# Patient Record
Sex: MALE | Race: WHITE | NOT HISPANIC OR LATINO | Employment: FULL TIME | URBAN - METROPOLITAN AREA
[De-identification: names, ages, dates, MRNs, and addresses within clinical notes are randomized per-mention and may not be internally consistent; named-entity substitution may affect disease eponyms.]

---

## 2018-12-22 ENCOUNTER — HOSPITAL ENCOUNTER (EMERGENCY)
Facility: CLINIC | Age: 43
Discharge: HOME | End: 2018-12-22
Attending: PHYSICIAN ASSISTANT | Admitting: PHYSICIAN ASSISTANT
Payer: COMMERCIAL

## 2018-12-22 ENCOUNTER — APPOINTMENT (OUTPATIENT)
Dept: CT IMAGING | Facility: CLINIC | Age: 43
End: 2018-12-22
Attending: PHYSICIAN ASSISTANT
Payer: COMMERCIAL

## 2018-12-22 ENCOUNTER — APPOINTMENT (OUTPATIENT)
Dept: GENERAL RADIOLOGY | Facility: CLINIC | Age: 43
End: 2018-12-22
Attending: PHYSICIAN ASSISTANT
Payer: COMMERCIAL

## 2018-12-22 VITALS
DIASTOLIC BLOOD PRESSURE: 84 MMHG | SYSTOLIC BLOOD PRESSURE: 133 MMHG | WEIGHT: 147 LBS | TEMPERATURE: 99 F | RESPIRATION RATE: 20 BRPM | OXYGEN SATURATION: 99 % | HEART RATE: 82 BPM

## 2018-12-22 DIAGNOSIS — S40.011A CONTUSION OF RIGHT SHOULDER, INITIAL ENCOUNTER: ICD-10-CM

## 2018-12-22 DIAGNOSIS — S00.83XA FACIAL HEMATOMA, INITIAL ENCOUNTER: ICD-10-CM
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
## 2018-12-22 PROCEDURE — 73030 X-RAY EXAM OF SHOULDER: CPT | Mod: TC,RT

## 2018-12-22 PROCEDURE — 99285 EMERGENCY DEPT VISIT HI MDM: CPT | Mod: 25 | Performed by: PHYSICIAN ASSISTANT

## 2018-12-22 PROCEDURE — 70450 CT HEAD/BRAIN W/O DYE: CPT

## 2018-12-22 PROCEDURE — 99284 EMERGENCY DEPT VISIT MOD MDM: CPT | Mod: Z6 | Performed by: PHYSICIAN ASSISTANT

## 2018-12-22 PROCEDURE — 70486 CT MAXILLOFACIAL W/O DYE: CPT

## 2018-12-22 NOTE — ED TRIAGE NOTES
Patient fell in his driveway about 1300 today. He has a hematoma to his right cheek. He admits to drinking 10 beers today.

## 2018-12-22 NOTE — ED PROVIDER NOTES
History     Chief Complaint   Patient presents with     Head Injury     The history is provided by the patient.     Keith Cooper is a 43 year old male who presents to the ED complaining of right shoulder and right sided facial pain. Patient states he slipped on a patch of ice this afternoon and fell onto both his shoulder and face. He didn't lose consciousness from the fall. He applied ice immediately. Patient has significant swelling to his face but denies significant amounts of pain currently. Patient denies bloody nose. His vision is normal. He denies confusion. Patient had a few drinks this morning, approximately 10 beers. He has a history of 2 cataract surgeries. He has a history of many broken bones because he has osteogenesis imperfecta.    Problem List:    Patient Active Problem List    Diagnosis Date Noted     Open wound of finger 09/07/2001     Priority: Medium     Problem list name updated by automated process. Provider to review          Past Medical History:    History reviewed. No pertinent past medical history.    Past Surgical History:    Past Surgical History:   Procedure Laterality Date     EYE SURGERY       ORTHOPEDIC SURGERY         Family History:    No family history on file.    Social History:  Marital Status:  Single [1]  Social History     Tobacco Use     Smoking status: Never Smoker     Smokeless tobacco: Current User   Substance Use Topics     Alcohol use: Yes     Comment: occassionally     Drug use: No        Medications:      cetirizine (ZYRTEC) 10 MG tablet   DiphenhydrAMINE HCl (BENADRYL PO)   fexofenadine (ALLEGRA) 180 MG tablet         Review of Systems   All other systems reviewed and are negative.      Physical Exam   BP: 133/84  Pulse: 82  Temp: 99  F (37.2  C)  Resp: 20  Weight: 66.7 kg (147 lb)  SpO2: 99 %      Physical Exam   Constitutional: He is oriented to person, place, and time. No distress.   HENT:   Head: Normocephalic and atraumatic.       Right Ear: External ear  normal.   Left Ear: External ear normal.   Nose: Nose normal.   Mouth/Throat: Oropharynx is clear and moist. No oropharyngeal exudate.   No hemotympanum.  Negative machuca sign.   Eyes: Conjunctivae and EOM are normal. Pupils are equal, round, and reactive to light. Right eye exhibits no discharge. Left eye exhibits no discharge. No scleral icterus.   Neck: Normal range of motion. Neck supple. No thyromegaly present.   Cardiovascular: Normal rate, regular rhythm and normal heart sounds.   No murmur heard.  Pulmonary/Chest: Effort normal and breath sounds normal. No respiratory distress. He has no wheezes. He has no rales. He exhibits no tenderness.   Abdominal: Soft. Bowel sounds are normal. He exhibits no distension and no mass. There is no tenderness. There is no rebound and no guarding.   Musculoskeletal: He exhibits no edema or deformity.        Right shoulder: He exhibits decreased range of motion and tenderness (superior humerus and distal clavicle). He exhibits no swelling, no effusion, no crepitus, no deformity and no laceration.        Right elbow: Normal.He exhibits normal range of motion, no swelling, no effusion, no deformity and no laceration. No tenderness found. No radial head, no medial epicondyle, no lateral epicondyle and no olecranon process tenderness noted.        Cervical back: Normal. He exhibits normal range of motion, no tenderness, no bony tenderness and no swelling.   Lymphadenopathy:     He has no cervical adenopathy.   Neurological: He is alert and oriented to person, place, and time. No cranial nerve deficit.   Skin: Skin is warm and dry. Capillary refill takes less than 2 seconds. No rash noted. He is not diaphoretic. No erythema.   Psychiatric: He has a normal mood and affect. His behavior is normal. Thought content normal.   Nursing note and vitals reviewed.      ED Course        Procedures               Critical Care time:  none               Results for orders placed or performed  during the hospital encounter of 12/22/18 (from the past 24 hour(s))   XR Shoulder Right 3 Views    Narrative    XR SHOULDER RT G/E 3 VW 12/22/2018 3:01 PM    COMPARISON: None.    HISTORY: Proximal humerus pain and distal clavicle pain after fall.      Impression    IMPRESSION: No fracture or dislocation seen in the RIGHT shoulder.  Joint spaces are preserved. No significant soft tissue swelling.    BETTE MORRIS MD   CT Head w/o Contrast    Narrative    CT SCAN OF THE HEAD WITHOUT CONTRAST   12/22/2018 3:13 PM     HISTORY: Head trauma, frontal headache, fall, intoxicated.    TECHNIQUE:  Axial images of the head and coronal reformations without  IV contrast material. Radiation dose for this scan was reduced using  automated exposure control, adjustment of the mA and/or kV according  to patient size, or iterative reconstruction technique.    COMPARISON: None.    FINDINGS:  There is generalized atrophy of the brain more than  expected for age. There is an arachnoid cyst posteriorly in the right  posterior cranial fossa causing mild impression on the cerebellar  hemisphere. There is no evidence of intracranial hemorrhage, mass,  acute infarct or anomaly.     The visualized portions of the sinuses and mastoids appear normal.  There is no evidence of trauma.      Impression    IMPRESSION:    1. No evidence of acute trauma.  2. Brain atrophy.  3. Arachnoid cyst in the right posterior cranial fossa.     POP DIEZ MD   CT Maxillofacial w/o Contrast    Narrative    CT SCAN OF THE FACE WITHOUT CONTRAST 12/22/2018 3:14 PM     HISTORY: Facial trauma, fracture suspected; fall, history of  osteogenesis imperfecta, right zygomatic arch swelling/pain.    TECHNIQUE: Radiation dose for this scan was reduced using automated  exposure control, adjustment of the mA and/or kV according to patient  size, or iterative reconstruction technique. Noncontrast axial scans  and coronal and sagittal reformations.     COMPARISON:  None.    FINDINGS: Soft tissue swelling is seen lateral to the right malar  eminence with a small focal hematoma. There is no underlying fracture.  Left maxillary sinus is hypoplastic. No other abnormality is seen.      Impression    IMPRESSION: Subcutaneous hematoma and soft tissue swelling over the  right malar eminence with no underlying fracture.       POP DIEZ MD       Medications - No data to display    Assessments & Plan (with Medical Decision Making)     Fall  Facial hematoma, initial encounter  Contusion of right shoulder, initial encounter   Keith Cooper is a 43 year old male with underlying osteogenesis imperfecta who presents to the ED complaining of right sided facial pain and swelling along with right shoulder pain. He was under alcoholic intoxication this morning when he slipped on the ice. Patient's vitals are unremarkable. Upon examination he has a significant hematoma on the zygomatic arch of the right face.  Extraocular movements intact.  No other periorbital tenderness.  Also has some tenderness of the superior humerus and distal right clavicle.  No deformity.. Head CT and XR of the left shoulder was collected, and no evidence to suggest underlying fracture.   Rest, ice, compression, and elevation discussed.  Tylenol as needed for discomfort.  He declined needing anything for pain here in the ED.  I offered him a sling for his shoulder, but he declined.  Indications to return discussed with him.  Patient was in agreement with this plan and was suitable for discharge.     I have reviewed the nursing notes.    I have reviewed the findings, diagnosis, plan and need for follow up with the patient.          Medication List      There are no discharge medications for this visit.         Final diagnoses:   Fall   Facial hematoma, initial encounter   Contusion of right shoulder, initial encounter     This document serves as a record of services personally performed by Kendall Nova PA.  It was created on their behalf by Pavel Ndiaye, a trained medical scribe. The creation of this record is based on the provider's personal observations and the statements of the patient. This document has been checked and approved by the attending provider.    Note: Chart documentation done in part with Dragon Voice Recognition software. Although reviewed after completion, some word and grammatical errors may remain.    12/22/2018   Kendall Nova PA-C   [facility] EMERGENCY DEPARTMENT     Kendall Nova PA-C  12/22/18 6595

## 2018-12-22 NOTE — DISCHARGE INSTRUCTIONS
It was a pleasure working with you today!  I hope your condition improves rapidly!     Thankfully, we did not find any fracture with your evaluation today.  Just bumps and bruises.  Please use ice on your shoulder and face for 20 minutes every 2 hours today to keep the swelling down.  Use heat tomorrow to help move the bruise out of the skin.

## 2020-05-15 ENCOUNTER — HOSPITAL ENCOUNTER (EMERGENCY)
Facility: CLINIC | Age: 45
Discharge: HOME | End: 2020-05-15
Attending: EMERGENCY MEDICINE | Admitting: EMERGENCY MEDICINE
Payer: COMMERCIAL

## 2020-05-15 VITALS
TEMPERATURE: 97.9 F | SYSTOLIC BLOOD PRESSURE: 135 MMHG | DIASTOLIC BLOOD PRESSURE: 94 MMHG | OXYGEN SATURATION: 99 % | WEIGHT: 150 LBS | RESPIRATION RATE: 20 BRPM

## 2020-05-15 DIAGNOSIS — S01.412A LACERATION OF LEFT TEMPOROMANDIBULAR AREA WITHOUT FOREIGN BODY, INITIAL ENCOUNTER: ICD-10-CM

## 2020-05-15 PROCEDURE — 99282 EMERGENCY DEPT VISIT SF MDM: CPT | Mod: 25 | Performed by: EMERGENCY MEDICINE

## 2020-05-15 PROCEDURE — 25000128 H RX IP 250 OP 636: Performed by: EMERGENCY MEDICINE

## 2020-05-15 PROCEDURE — 90471 IMMUNIZATION ADMIN: CPT | Performed by: EMERGENCY MEDICINE

## 2020-05-15 PROCEDURE — 12013 RPR F/E/E/N/L/M 2.6-5.0 CM: CPT | Performed by: EMERGENCY MEDICINE

## 2020-05-15 PROCEDURE — 99282 EMERGENCY DEPT VISIT SF MDM: CPT | Performed by: EMERGENCY MEDICINE

## 2020-05-15 PROCEDURE — 90715 TDAP VACCINE 7 YRS/> IM: CPT | Performed by: EMERGENCY MEDICINE

## 2020-05-15 PROCEDURE — 12013 RPR F/E/E/N/L/M 2.6-5.0 CM: CPT | Mod: Z6 | Performed by: EMERGENCY MEDICINE

## 2020-05-15 RX ORDER — BUPIVACAINE HYDROCHLORIDE AND EPINEPHRINE 2.5; 5 MG/ML; UG/ML
10 INJECTION, SOLUTION EPIDURAL; INFILTRATION; INTRACAUDAL; PERINEURAL ONCE
Status: DISCONTINUED | OUTPATIENT
Start: 2020-05-15 | End: 2020-05-16 | Stop reason: HOSPADM

## 2020-05-15 RX ADMIN — CLOSTRIDIUM TETANI TOXOID ANTIGEN (FORMALDEHYDE INACTIVATED), CORYNEBACTERIUM DIPHTHERIAE TOXOID ANTIGEN (FORMALDEHYDE INACTIVATED), BORDETELLA PERTUSSIS TOXOID ANTIGEN (GLUTARALDEHYDE INACTIVATED), BORDETELLA PERTUSSIS FILAMENTOUS HEMAGGLUTININ ANTIGEN (FORMALDEHYDE INACTIVATED), BORDETELLA PERTUSSIS PERTACTIN ANTIGEN, AND BORDETELLA PERTUSSIS FIMBRIAE 2/3 ANTIGEN 0.5 ML: 5; 2; 2.5; 5; 3; 5 INJECTION, SUSPENSION INTRAMUSCULAR at 22:24

## 2020-05-15 NOTE — ED AVS SNAPSHOT
[facility] Emergency Department  [address] [place] [address]  Phone:  [phone]  Fax:  [phone]                                    Keith Cooper   MRN: [id number]    Department:  [facility] Emergency Department   Date of Visit:  5/15/2020           After Visit Summary Signature Page    I have received my discharge instructions, and my questions have been answered. I have discussed any challenges I see with this plan with the nurse or doctor.    ..........................................................................................................................................  Patient/Patient Representative Signature      ..........................................................................................................................................  Patient Representative Print Name and Relationship to Patient    ..................................................               ................................................  Date                                   Time    ..........................................................................................................................................  Reviewed by Signature/Title    ...................................................              ..............................................  Date                                               Time          22EPIC Rev 08/18

## 2020-05-16 NOTE — ED NOTES
Bed: ED08  Expected date:   Expected time:   Means of arrival:   Comments:  EMS fall head laceration

## 2020-05-16 NOTE — ED PROVIDER NOTES
History     Chief Complaint   Patient presents with     Head Laceration     HPI  Keith Cooper is a 44 year old male who presents with a laceration to his left temporal region.  Patient admits that he been drinking at least 8 16 ounce beers this evening.  Unfortunately he tripped and fell hitting his head.  He had no loss of conscious.  Denies headache or visual change.  Wears a hearing aid in his left ear but denies change in his hearing.  No dental or malocclusion.  Denies neck or back pain.  Did not injure his extremities.  Currently denies any focal motor weakness or new sensory changes.  Needs an updated tetanus as the last one was in 2001.  Other than dressing no treatment prior to arrival.  Adequate hemostasis upon arrival.    Allergies:  Allergies   Allergen Reactions     Percocet [Oxycodone-Acetaminophen] Itching     Bees Rash       Problem List:    Patient Active Problem List    Diagnosis Date Noted     Open wound of finger 09/07/2001     Priority: Medium     Problem list name updated by automated process. Provider to review          Past Medical History:    History reviewed. No pertinent past medical history.    Past Surgical History:    Past Surgical History:   Procedure Laterality Date     EYE SURGERY       ORTHOPEDIC SURGERY         Family History:    History reviewed. No pertinent family history.    Social History:  Marital Status:  Single [1]  Social History     Tobacco Use     Smoking status: Never Smoker     Smokeless tobacco: Current User   Substance Use Topics     Alcohol use: Yes     Drug use: No        Medications:    cetirizine (ZYRTEC) 10 MG tablet  DiphenhydrAMINE HCl (BENADRYL PO)  fexofenadine (ALLEGRA) 180 MG tablet          Review of Systems all other systems are reviewed and are negative.    Physical Exam   BP: (!) 135/94  Heart Rate: 78  Temp: 97.9  F (36.6  C)  Resp: 18  Weight: 68 kg (150 lb)  SpO2: 99 %      Physical Exam neuro alert pleasant male in mild distress.  HEENT shows  a 3.0 cm V-shaped laceration in the right temporal region.  Crepitus or step-off.  Patient has no hemotympanum or machuca signs.  No raccoons eyes.  No epistasis or rhinorrhea.  No dental trauma malocclusion.  Neck is supple and nontender.  Back is nontender.  Neurologically nonfocal exam for motor and sensory standpoint.  Patient is able ambulate without assistance.    ED Course        Procedures        A field block of Sensorcaine with epi was instilled for anesthetic.  Wound was irrigated with normal saline.  Wound was then closed with  6 4.0 Ethilon sutures interrupted fashion.  Unfortunately with placement of the first suture patient had brisk bleeding from the site.  Despite additional suturing he continued to bleed.  A pressure dressing was applied and this resolved.  The area was observed for a period of time without active rebleeding.  Antibiotic and dressing applied.       Critical Care time:  none               No results found for this or any previous visit (from the past 24 hour(s)).    Medications   bupivacaine 0.25 % - EPINEPHrine 1:200,000 (PF) injection 25 mg (has no administration in time range)   Tdap (tetanus-diphtheria-acell pertussis) (ADACEL) injection 0.5 mL (0.5 mLs Intramuscular Given 5/15/20 2224)       Assessments & Plan (with Medical Decision Making)   Keith Cooper is a 44 year old male who presents with a laceration to his left temporal region.  Patient admits that he been drinking at least 8 16 ounce beers this evening.  Unfortunately he tripped and fell hitting his head.  He had no loss of conscious.  Denies headache or visual change.  Wears a hearing aid in his left ear but denies change in his hearing.  No dental or malocclusion.  Denies neck or back pain.  Did not injure his extremities.  Currently denies any focal motor weakness or new sensory changes.  Needs an updated tetanus as the last one was in 2001.  Other than dressing no treatment prior to arrival.  Adequate hemostasis  upon arrival.  On presentation patient was alert and cooperative.  He had a V-shaped laceration that was repaired as above.  His exam otherwise was unremarkable.  No evidence of intracranial bleed.  Tetanus is updated.  Patient laceration care is provided.  Recommend he decrease alcohol he consumes.  He does not feel this is a problem does not feel that is necessary.  I have reviewed the nursing notes.    I have reviewed the findings, diagnosis, plan and need for follow up with the patient.       New Prescriptions    No medications on file       Final diagnoses:   Laceration of left temporomandibular area without foreign body, initial encounter       5/15/2020   [facility] EMERGENCY DEPARTMENT     Chano Saleh MD  05/15/20 0907

## 2020-05-16 NOTE — ED TRIAGE NOTES
Has had more than 8 tall beers tonight, states tripped, fell and hit his head, has a laceration to the right side of his head.

## 2021-07-07 ENCOUNTER — APPOINTMENT (OUTPATIENT)
Dept: CT IMAGING | Facility: CLINIC | Age: 46
DRG: 392 | End: 2021-07-07
Attending: NURSE PRACTITIONER
Payer: COMMERCIAL

## 2021-07-07 ENCOUNTER — HOSPITAL ENCOUNTER (INPATIENT)
Facility: CLINIC | Age: 46
LOS: 2 days | Discharge: HOME | DRG: 392 | End: 2021-07-09
Attending: NURSE PRACTITIONER | Admitting: INTERNAL MEDICINE
Payer: COMMERCIAL

## 2021-07-07 DIAGNOSIS — K57.20 DIVERTICULITIS OF LARGE INTESTINE WITH PERFORATION WITHOUT BLEEDING: ICD-10-CM

## 2021-07-07 PROBLEM — K57.92 ACUTE DIVERTICULITIS: Status: ACTIVE | Noted: 2021-07-07

## 2021-07-07 LAB
ALBUMIN SERPL-MCNC: 4.1 G/DL (ref 3.4–5)
ALBUMIN UR-MCNC: NEGATIVE MG/DL
ALP SERPL-CCNC: 50 U/L (ref 40–150)
ALT SERPL W P-5'-P-CCNC: 23 U/L (ref 0–70)
ANION GAP SERPL CALCULATED.3IONS-SCNC: 5 MMOL/L (ref 3–14)
APPEARANCE UR: CLEAR
AST SERPL W P-5'-P-CCNC: 19 U/L (ref 0–45)
BASOPHILS # BLD AUTO: 0 10E9/L (ref 0–0.2)
BASOPHILS NFR BLD AUTO: 0.2 %
BILIRUB SERPL-MCNC: 0.7 MG/DL (ref 0.2–1.3)
BILIRUB UR QL STRIP: NEGATIVE
BUN SERPL-MCNC: 8 MG/DL (ref 7–30)
CALCIUM SERPL-MCNC: 8.9 MG/DL (ref 8.5–10.1)
CHLORIDE SERPL-SCNC: 100 MMOL/L (ref 94–109)
CO2 SERPL-SCNC: 30 MMOL/L (ref 20–32)
COLOR UR AUTO: NORMAL
CREAT SERPL-MCNC: 0.74 MG/DL (ref 0.66–1.25)
DIFFERENTIAL METHOD BLD: ABNORMAL
EOSINOPHIL # BLD AUTO: 0 10E9/L (ref 0–0.7)
EOSINOPHIL NFR BLD AUTO: 0.3 %
ERYTHROCYTE [DISTWIDTH] IN BLOOD BY AUTOMATED COUNT: 15 % (ref 10–15)
GFR SERPL CREATININE-BSD FRML MDRD: >90 ML/MIN/{1.73_M2}
GLUCOSE SERPL-MCNC: 97 MG/DL (ref 70–99)
GLUCOSE UR STRIP-MCNC: NEGATIVE MG/DL
HCT VFR BLD AUTO: 42.2 % (ref 40–53)
HGB BLD-MCNC: 14.1 G/DL (ref 13.3–17.7)
HGB UR QL STRIP: NEGATIVE
IMM GRANULOCYTES # BLD: 0 10E9/L (ref 0–0.4)
IMM GRANULOCYTES NFR BLD: 0.4 %
KETONES UR STRIP-MCNC: NEGATIVE MG/DL
LEUKOCYTE ESTERASE UR QL STRIP: NEGATIVE
LIPASE SERPL-CCNC: 72 U/L (ref 73–393)
LYMPHOCYTES # BLD AUTO: 1.8 10E9/L (ref 0.8–5.3)
LYMPHOCYTES NFR BLD AUTO: 15.9 %
MCH RBC QN AUTO: 32.5 PG (ref 26.5–33)
MCHC RBC AUTO-ENTMCNC: 33.4 G/DL (ref 31.5–36.5)
MCV RBC AUTO: 97 FL (ref 78–100)
MONOCYTES # BLD AUTO: 0.9 10E9/L (ref 0–1.3)
MONOCYTES NFR BLD AUTO: 8 %
NEUTROPHILS # BLD AUTO: 8.4 10E9/L (ref 1.6–8.3)
NEUTROPHILS NFR BLD AUTO: 75.2 %
NITRATE UR QL: NEGATIVE
NRBC # BLD AUTO: 0 10*3/UL
NRBC BLD AUTO-RTO: 0 /100
PH UR STRIP: 6 PH (ref 5–7)
PLATELET # BLD AUTO: 223 10E9/L (ref 150–450)
POTASSIUM SERPL-SCNC: 3.4 MMOL/L (ref 3.4–5.3)
PROT SERPL-MCNC: 8.7 G/DL (ref 6.8–8.8)
RBC # BLD AUTO: 4.34 10E12/L (ref 4.4–5.9)
RBC #/AREA URNS AUTO: 0 /HPF (ref 0–2)
SODIUM SERPL-SCNC: 135 MMOL/L (ref 133–144)
SOURCE: NORMAL
SP GR UR STRIP: 1 (ref 1–1.03)
UROBILINOGEN UR STRIP-MCNC: 0 MG/DL (ref 0–2)
WBC # BLD AUTO: 11.2 10E9/L (ref 4–11)
WBC #/AREA URNS AUTO: 0 /HPF (ref 0–5)

## 2021-07-07 PROCEDURE — 250N000011 HC RX IP 250 OP 636: Performed by: NURSE PRACTITIONER

## 2021-07-07 PROCEDURE — 80053 COMPREHEN METABOLIC PANEL: CPT | Performed by: NURSE PRACTITIONER

## 2021-07-07 PROCEDURE — 99222 1ST HOSP IP/OBS MODERATE 55: CPT | Mod: AI | Performed by: INTERNAL MEDICINE

## 2021-07-07 PROCEDURE — 96375 TX/PRO/DX INJ NEW DRUG ADDON: CPT | Performed by: EMERGENCY MEDICINE

## 2021-07-07 PROCEDURE — 96365 THER/PROPH/DIAG IV INF INIT: CPT | Mod: 59 | Performed by: EMERGENCY MEDICINE

## 2021-07-07 PROCEDURE — 99207 PR CDG-CHARGE REQUIRED MANUAL ENTRY: CPT | Mod: 59 | Performed by: INTERNAL MEDICINE

## 2021-07-07 PROCEDURE — 258N000003 HC RX IP 258 OP 636: Performed by: NURSE PRACTITIONER

## 2021-07-07 PROCEDURE — 250N000009 HC RX 250: Performed by: NURSE PRACTITIONER

## 2021-07-07 PROCEDURE — 120N000001 HC R&B MED SURG/OB

## 2021-07-07 PROCEDURE — 250N000011 HC RX IP 250 OP 636: Performed by: INTERNAL MEDICINE

## 2021-07-07 PROCEDURE — 83690 ASSAY OF LIPASE: CPT | Performed by: NURSE PRACTITIONER

## 2021-07-07 PROCEDURE — 85025 COMPLETE CBC W/AUTO DIFF WBC: CPT | Performed by: NURSE PRACTITIONER

## 2021-07-07 PROCEDURE — 258N000003 HC RX IP 258 OP 636: Performed by: INTERNAL MEDICINE

## 2021-07-07 PROCEDURE — 99207 PR NOT IN PERSON INPATIENT CONSULT STATISTICAL MARKER: CPT | Performed by: INTERNAL MEDICINE

## 2021-07-07 PROCEDURE — 81001 URINALYSIS AUTO W/SCOPE: CPT | Performed by: NURSE PRACTITIONER

## 2021-07-07 PROCEDURE — 74177 CT ABD & PELVIS W/CONTRAST: CPT

## 2021-07-07 PROCEDURE — 99285 EMERGENCY DEPT VISIT HI MDM: CPT | Performed by: EMERGENCY MEDICINE

## 2021-07-07 PROCEDURE — 99285 EMERGENCY DEPT VISIT HI MDM: CPT | Mod: 25 | Performed by: EMERGENCY MEDICINE

## 2021-07-07 RX ORDER — PROCHLORPERAZINE 25 MG
25 SUPPOSITORY, RECTAL RECTAL EVERY 12 HOURS PRN
Status: DISCONTINUED | OUTPATIENT
Start: 2021-07-07 | End: 2021-07-09 | Stop reason: HOSPADM

## 2021-07-07 RX ORDER — PROCHLORPERAZINE MALEATE 5 MG
10 TABLET ORAL EVERY 6 HOURS PRN
Status: DISCONTINUED | OUTPATIENT
Start: 2021-07-07 | End: 2021-07-09 | Stop reason: HOSPADM

## 2021-07-07 RX ORDER — KETOROLAC TROMETHAMINE 15 MG/ML
15 INJECTION, SOLUTION INTRAMUSCULAR; INTRAVENOUS EVERY 6 HOURS PRN
Status: DISCONTINUED | OUTPATIENT
Start: 2021-07-07 | End: 2021-07-09 | Stop reason: HOSPADM

## 2021-07-07 RX ORDER — FEXOFENADINE HCL 180 MG/1
180 TABLET ORAL DAILY
Status: DISCONTINUED | OUTPATIENT
Start: 2021-07-08 | End: 2021-07-08

## 2021-07-07 RX ORDER — MORPHINE SULFATE 2 MG/ML
2-4 INJECTION, SOLUTION INTRAMUSCULAR; INTRAVENOUS
Status: DISCONTINUED | OUTPATIENT
Start: 2021-07-07 | End: 2021-07-08

## 2021-07-07 RX ORDER — ONDANSETRON 4 MG/1
4 TABLET, ORALLY DISINTEGRATING ORAL EVERY 6 HOURS PRN
Status: DISCONTINUED | OUTPATIENT
Start: 2021-07-07 | End: 2021-07-09 | Stop reason: HOSPADM

## 2021-07-07 RX ORDER — IOPAMIDOL 755 MG/ML
500 INJECTION, SOLUTION INTRAVASCULAR ONCE
Status: COMPLETED | OUTPATIENT
Start: 2021-07-07 | End: 2021-07-07

## 2021-07-07 RX ORDER — SODIUM CHLORIDE 9 MG/ML
INJECTION, SOLUTION INTRAVENOUS CONTINUOUS
Status: DISCONTINUED | OUTPATIENT
Start: 2021-07-07 | End: 2021-07-09

## 2021-07-07 RX ORDER — IBUPROFEN 600 MG/1
600 TABLET, FILM COATED ORAL EVERY 6 HOURS PRN
Status: DISCONTINUED | OUTPATIENT
Start: 2021-07-07 | End: 2021-07-09 | Stop reason: HOSPADM

## 2021-07-07 RX ORDER — LIDOCAINE 40 MG/G
CREAM TOPICAL
Status: DISCONTINUED | OUTPATIENT
Start: 2021-07-07 | End: 2021-07-09 | Stop reason: HOSPADM

## 2021-07-07 RX ORDER — KETOROLAC TROMETHAMINE 30 MG/ML
30 INJECTION, SOLUTION INTRAMUSCULAR; INTRAVENOUS ONCE
Status: COMPLETED | OUTPATIENT
Start: 2021-07-07 | End: 2021-07-07

## 2021-07-07 RX ORDER — AMPICILLIN AND SULBACTAM 2; 1 G/1; G/1
3 INJECTION, POWDER, FOR SOLUTION INTRAMUSCULAR; INTRAVENOUS EVERY 6 HOURS
Status: DISCONTINUED | OUTPATIENT
Start: 2021-07-07 | End: 2021-07-07

## 2021-07-07 RX ORDER — ONDANSETRON 2 MG/ML
4 INJECTION INTRAMUSCULAR; INTRAVENOUS EVERY 6 HOURS PRN
Status: DISCONTINUED | OUTPATIENT
Start: 2021-07-07 | End: 2021-07-09 | Stop reason: HOSPADM

## 2021-07-07 RX ADMIN — IOPAMIDOL 74 ML: 755 INJECTION, SOLUTION INTRAVENOUS at 18:01

## 2021-07-07 RX ADMIN — AMPICILLIN SODIUM AND SULBACTAM SODIUM 3 G: 2; 1 INJECTION, POWDER, FOR SOLUTION INTRAMUSCULAR; INTRAVENOUS at 19:27

## 2021-07-07 RX ADMIN — KETOROLAC TROMETHAMINE 30 MG: 30 INJECTION, SOLUTION INTRAMUSCULAR; INTRAVENOUS at 18:32

## 2021-07-07 RX ADMIN — SODIUM CHLORIDE: 9 INJECTION, SOLUTION INTRAVENOUS at 19:27

## 2021-07-07 RX ADMIN — SODIUM CHLORIDE 60 ML: 9 INJECTION, SOLUTION INTRAVENOUS at 18:00

## 2021-07-07 RX ADMIN — SODIUM CHLORIDE: 9 INJECTION, SOLUTION INTRAVENOUS at 23:11

## 2021-07-07 RX ADMIN — MORPHINE SULFATE 4 MG: 2 INJECTION, SOLUTION INTRAMUSCULAR; INTRAVENOUS at 21:52

## 2021-07-07 ASSESSMENT — ACTIVITIES OF DAILY LIVING (ADL)
DOING_ERRANDS_INDEPENDENTLY_DIFFICULTY: NO
DRESSING/BATHING_DIFFICULTY: NO
FALL_HISTORY_WITHIN_LAST_SIX_MONTHS: YES
WALKING_OR_CLIMBING_STAIRS_DIFFICULTY: NO
WERE_AUXILIARY_AIDS_OFFERED?: YES
DIFFICULTY_COMMUNICATING: NO
CONCENTRATING,_REMEMBERING_OR_MAKING_DECISIONS_DIFFICULTY: NO
WEAR_GLASSES_OR_BLIND: NO
TOILETING_ISSUES: NO
PATIENT'S_PREFERRED_MEANS_OF_COMMUNICATION: VERBAL
NUMBER_OF_TIMES_PATIENT_HAS_FALLEN_WITHIN_LAST_SIX_MONTHS: 1
DIFFICULTY_EATING/SWALLOWING: NO
HEARING_DIFFICULTY_OR_DEAF: YES
USE_OF_HEARING_ASSISTIVE_DEVICES: LEFT HEARING AID
THE_FOLLOWING_AIDS_WERE_PROVIDED;: POCKET TALKER
PATIENT_/_FAMILY_COMMUNICATION_STYLE: SPOKEN LANGUAGE (ENGLISH OR BILINGUAL)
DESCRIBE_HEARING_LOSS: HEARING LOSS ON LEFT SIDE

## 2021-07-07 ASSESSMENT — MIFFLIN-ST. JEOR: SCORE: 1500.9

## 2021-07-07 NOTE — ED PROVIDER NOTES
"  History     Chief Complaint   Patient presents with     Flank Pain     HPI  Keith Cooper is a 45 year old male who presents with left abdominal pain that started yesterday. Pain is constant but worsens with movement and deep breath. He can sleep only if he lays on the left side. He feels like something is bulging in his left side. Denies nausea or vomiting. Denies diarrhea or constipation. Of note he did fall off a ladder about 4 feet up on Saturday landing on his right side.  He states suffered some bruises on his right leg, but was not having any abdominal pain at that time.  Non-smoker.  Drinks \"a few\" beers every day.  Denies having any history of withdrawal symptoms when he stops drinking beer.    Allergies:  Allergies   Allergen Reactions     Percocet [Oxycodone-Acetaminophen] Itching     Bees Rash       Problem List:    Patient Active Problem List    Diagnosis Date Noted     Acute diverticulitis 07/07/2021     Priority: Medium     Open wound of finger 09/07/2001     Priority: Medium     Problem list name updated by automated process. Provider to review          Past Medical History:    No past medical history on file.    Past Surgical History:    Past Surgical History:   Procedure Laterality Date     EYE SURGERY       ORTHOPEDIC SURGERY         Family History:    No family history on file.    Social History:  Marital Status:  Single [1]  Social History     Tobacco Use     Smoking status: Never Smoker     Smokeless tobacco: Current User   Substance Use Topics     Alcohol use: Yes     Drug use: No        Medications:    fexofenadine (ALLEGRA) 180 MG tablet          Review of Systems  As mentioned above in the history present illness. All other systems were reviewed and are negative.    Physical Exam   BP: (!) 140/83  Pulse: 102  Temp: 98.9  F (37.2  C)  Resp: 18  Weight: 68 kg (150 lb)  SpO2: 99 %      Physical Exam  Constitutional:       General: He is not in acute distress.     Appearance: Normal " appearance. He is not ill-appearing, toxic-appearing or diaphoretic.   HENT:      Head: Normocephalic and atraumatic.      Nose: Nose normal.      Mouth/Throat:      Mouth: Mucous membranes are moist.   Eyes:      General: No scleral icterus.     Conjunctiva/sclera: Conjunctivae normal.   Neck:      Musculoskeletal: Normal range of motion and neck supple.   Cardiovascular:      Rate and Rhythm: Normal rate and regular rhythm.      Heart sounds: Normal heart sounds.   Pulmonary:      Effort: Pulmonary effort is normal. No respiratory distress.      Breath sounds: Normal breath sounds.   Abdominal:      General: Bowel sounds are normal.      Palpations: Abdomen is soft. There is no hepatomegaly or splenomegaly.      Tenderness: There is abdominal tenderness in the left upper quadrant.       Musculoskeletal: Normal range of motion.         General: No tenderness.   Skin:     General: Skin is warm and dry.      Findings: No rash.   Neurological:      General: No focal deficit present.      Mental Status: He is alert and oriented to person, place, and time.           ED Course        Procedures         Results for orders placed or performed during the hospital encounter of 07/07/21 (from the past 24 hour(s))   CBC with platelets differential   Result Value Ref Range    WBC 11.2 (H) 4.0 - 11.0 10e9/L    RBC Count 4.34 (L) 4.4 - 5.9 10e12/L    Hemoglobin 14.1 13.3 - 17.7 g/dL    Hematocrit 42.2 40.0 - 53.0 %    MCV 97 78 - 100 fl    MCH 32.5 26.5 - 33.0 pg    MCHC 33.4 31.5 - 36.5 g/dL    RDW 15.0 10.0 - 15.0 %    Platelet Count 223 150 - 450 10e9/L    Diff Method Automated Method     % Neutrophils 75.2 %    % Lymphocytes 15.9 %    % Monocytes 8.0 %    % Eosinophils 0.3 %    % Basophils 0.2 %    % Immature Granulocytes 0.4 %    Nucleated RBCs 0 0 /100    Absolute Neutrophil 8.4 (H) 1.6 - 8.3 10e9/L    Absolute Lymphocytes 1.8 0.8 - 5.3 10e9/L    Absolute Monocytes 0.9 0.0 - 1.3 10e9/L    Absolute Eosinophils 0.0 0.0 - 0.7  10e9/L    Absolute Basophils 0.0 0.0 - 0.2 10e9/L    Abs Immature Granulocytes 0.0 0 - 0.4 10e9/L    Absolute Nucleated RBC 0.0    Comprehensive metabolic panel   Result Value Ref Range    Sodium 135 133 - 144 mmol/L    Potassium 3.4 3.4 - 5.3 mmol/L    Chloride 100 94 - 109 mmol/L    Carbon Dioxide 30 20 - 32 mmol/L    Anion Gap 5 3 - 14 mmol/L    Glucose 97 70 - 99 mg/dL    Urea Nitrogen 8 7 - 30 mg/dL    Creatinine 0.74 0.66 - 1.25 mg/dL    GFR Estimate >90 >60 mL/min/[1.73_m2]    GFR Estimate If Black >90 >60 mL/min/[1.73_m2]    Calcium 8.9 8.5 - 10.1 mg/dL    Bilirubin Total 0.7 0.2 - 1.3 mg/dL    Albumin 4.1 3.4 - 5.0 g/dL    Protein Total 8.7 6.8 - 8.8 g/dL    Alkaline Phosphatase 50 40 - 150 U/L    ALT 23 0 - 70 U/L    AST 19 0 - 45 U/L   Lipase   Result Value Ref Range    Lipase 72 (L) 73 - 393 U/L   UA with Microscopic   Result Value Ref Range    Color Urine Straw     Appearance Urine Clear     Glucose Urine Negative NEG^Negative mg/dL    Bilirubin Urine Negative NEG^Negative    Ketones Urine Negative NEG^Negative mg/dL    Specific Gravity Urine 1.003 1.003 - 1.035    Blood Urine Negative NEG^Negative    pH Urine 6.0 5.0 - 7.0 pH    Protein Albumin Urine Negative NEG^Negative mg/dL    Urobilinogen mg/dL 0.0 0.0 - 2.0 mg/dL    Nitrite Urine Negative NEG^Negative    Leukocyte Esterase Urine Negative NEG^Negative    Source Midstream Urine     WBC Urine 0 0 - 5 /HPF    RBC Urine 0 0 - 2 /HPF   CT ABDOMEN PELVIS W CONTRAST    Narrative    CT ABDOMEN PELVIS WITH CONTRAST 7/7/2021 6:09 PM    CLINICAL HISTORY: Left upper quadrant abdominal pain. Left upper  quadrant since yesterday. Did have a fall from ladder 4 days ago but  landed on his right side.    TECHNIQUE: CT scan of the abdomen and pelvis was performed following  injection of IV contrast. Multiplanar reformats were obtained. Dose  reduction techniques were used.  CONTRAST: 74mL Isovue-370    COMPARISON: None.    FINDINGS:   LOWER CHEST:  Normal.    HEPATOBILIARY: Normal.    PANCREAS: Normal.    SPLEEN: Normal.    ADRENAL GLANDS: Normal.    KIDNEYS/BLADDER: Normal.    BOWEL: Severe focal inflammatory wall thickening consistent with acute  diverticulitis along the mid descending colon at the left flank series  3 image 114 and adjacent images. There are adjacent small bubbles of  extraluminal gas posterior to the region of diverticulitis, for  example image 115. There is adjacent trace left pericolic gutter  fluid, but no loculated abscess can be seen at this time. Remainder of  the bowel shows no acute abnormality. No obstruction. Normal appendix.    PELVIC ORGANS: Normal.    ADDITIONAL FINDINGS: None.    MUSCULOSKELETAL: Degenerative change along with flattened appearance  of the bilateral femoral heads. No acute osseous abnormality otherwise  seen.      Impression    IMPRESSION:   1.  Severe acute diverticulitis involving the mid descending colon at  the left flank. There are adjacent small bubbles of extraluminal gas  consistent with contained perforated diverticulitis. No drainable  abscess seen at this time.  2.  No other acute abnormality.  3.  Flattened appearance of the bilateral femoral heads associated  with bilateral hip DJD.       Medications   sodium chloride 0.9% infusion (has no administration in time range)   ampicillin-sulbactam (UNASYN) 3 g vial to attach to  mL bag (has no administration in time range)   sodium chloride (PF) 0.9% PF flush 3 mL (3 mLs Intracatheter Given 7/7/21 1800)   iopamidol (ISOVUE-370) solution 500 mL (74 mLs Intravenous Given 7/7/21 1801)   new 100 ml saline bag (60 mLs Intravenous Given 7/7/21 1800)   ketorolac (TORADOL) injection 30 mg (30 mg Intravenous Given 7/7/21 1832)     1903: I consulted with on-call Surgeon, Dr. Lindsey. REcommends admission and IV antibiotics. Will be able available for consult and can see patient tomorrow.  1910: spoke with on-call tele-hospitalist for admission,   "Rodolfo, who agrees to assume care of patient on admission.  1912: heaven called me, recommends changing the Zosyn to Unasyn unless there is risk/need for pseudomonas coverage. Abx changed to Unasyn.    Assessments & Plan (with Medical Decision Making)    45 year old male who presents with left abdominal pain that started yesterday. Pain is constant but worsens with movement and deep breath. He can sleep only if he lays on the left side. He feels like something is bulging in his left side. Denies nausea or vomiting. Denies diarrhea or constipation. Of note he did fall off a ladder about 4 feet up on Saturday landing on his right side.  He states suffered some bruises on his right leg, but was not having any abdominal pain at that time.  Non-smoker.  Drinks \"a few\" beers every day.  Denies having any history of withdrawal symptoms when he stops drinking beer.  On exam patient is afebrile.  BP is mildly elevated.  Heart rate 102b/min.  Tenderness with palpation to the left upper quadrant and left flank region.  Otherwise abdomen is soft and nondistended.  WBC 11.2.  Electrolytes are normal.  Kidney function labs are normal.  LFTs are normal.  Lipase is 72.  Patient was given IV Toradol for pain.  I spoke with the on-call surgeon , Dr. Lindsey, who recommends admission, NPO, and IV antibiotics.  He will be available for consult and will see patient in morning.  I spoke with the on-call hospitalist, Dr. Reynolds, who agrees to assume care of patient on admission.  Initially ordered IV Zosyn but speak with the pharmacist recommends using Unasyn for intra-abdominal infection unless there is significant risk or concern for pseudomonal infection.  IV Unasyn 3gm was ordered and first dose has been given here in the emergency department. IV NS is at 125ml/hr.    I discussed the lab and imaging findings with patient.  He is agreeable to admission to the hospital.    I have reviewed the nursing notes.    I have reviewed the " findings, diagnosis, plan and need for follow up with the patient.    New Prescriptions    No medications on file       Final diagnoses:   None       7/7/2021   [facility] EMERGENCY DEPT     Colleen Bruner APRN CNP  07/07/21 1926

## 2021-07-08 LAB
ANION GAP SERPL CALCULATED.3IONS-SCNC: 6 MMOL/L (ref 3–14)
BUN SERPL-MCNC: 7 MG/DL (ref 7–30)
CALCIUM SERPL-MCNC: 7.6 MG/DL (ref 8.5–10.1)
CHLORIDE SERPL-SCNC: 108 MMOL/L (ref 94–109)
CO2 SERPL-SCNC: 26 MMOL/L (ref 20–32)
CREAT SERPL-MCNC: 0.72 MG/DL (ref 0.66–1.25)
GFR SERPL CREATININE-BSD FRML MDRD: >90 ML/MIN/{1.73_M2}
GLUCOSE SERPL-MCNC: 74 MG/DL (ref 70–99)
LABORATORY COMMENT REPORT: NORMAL
POTASSIUM SERPL-SCNC: 3.5 MMOL/L (ref 3.4–5.3)
SARS-COV-2 RNA RESP QL NAA+PROBE: NEGATIVE
SODIUM SERPL-SCNC: 140 MMOL/L (ref 133–144)
SPECIMEN SOURCE: NO

## 2021-07-08 PROCEDURE — 120N000001 HC R&B MED SURG/OB

## 2021-07-08 PROCEDURE — 87635 SARS-COV-2 COVID-19 AMP PRB: CPT | Performed by: INTERNAL MEDICINE

## 2021-07-08 PROCEDURE — 80048 BASIC METABOLIC PNL TOTAL CA: CPT | Performed by: INTERNAL MEDICINE

## 2021-07-08 PROCEDURE — 250N000011 HC RX IP 250 OP 636: Performed by: INTERNAL MEDICINE

## 2021-07-08 PROCEDURE — 36415 COLL VENOUS BLD VENIPUNCTURE: CPT | Performed by: INTERNAL MEDICINE

## 2021-07-08 PROCEDURE — 99222 1ST HOSP IP/OBS MODERATE 55: CPT | Performed by: SURGERY

## 2021-07-08 PROCEDURE — 258N000003 HC RX IP 258 OP 636: Performed by: INTERNAL MEDICINE

## 2021-07-08 RX ORDER — NALOXONE HYDROCHLORIDE 0.4 MG/ML
0.2 INJECTION, SOLUTION INTRAMUSCULAR; INTRAVENOUS; SUBCUTANEOUS
Status: DISCONTINUED | OUTPATIENT
Start: 2021-07-08 | End: 2021-07-09 | Stop reason: HOSPADM

## 2021-07-08 RX ORDER — NALOXONE HYDROCHLORIDE 0.4 MG/ML
0.4 INJECTION, SOLUTION INTRAMUSCULAR; INTRAVENOUS; SUBCUTANEOUS
Status: DISCONTINUED | OUTPATIENT
Start: 2021-07-08 | End: 2021-07-09 | Stop reason: HOSPADM

## 2021-07-08 RX ORDER — MORPHINE SULFATE 2 MG/ML
4 INJECTION, SOLUTION INTRAMUSCULAR; INTRAVENOUS EVERY 6 HOURS PRN
Status: DISCONTINUED | OUTPATIENT
Start: 2021-07-08 | End: 2021-07-09 | Stop reason: HOSPADM

## 2021-07-08 RX ADMIN — SODIUM CHLORIDE: 9 INJECTION, SOLUTION INTRAVENOUS at 07:16

## 2021-07-08 RX ADMIN — KETOROLAC TROMETHAMINE 15 MG: 15 INJECTION, SOLUTION INTRAMUSCULAR; INTRAVENOUS at 13:40

## 2021-07-08 RX ADMIN — TAZOBACTAM SODIUM AND PIPERACILLIN SODIUM 3.38 G: 375; 3 INJECTION, SOLUTION INTRAVENOUS at 07:14

## 2021-07-08 RX ADMIN — KETOROLAC TROMETHAMINE 15 MG: 15 INJECTION, SOLUTION INTRAMUSCULAR; INTRAVENOUS at 04:38

## 2021-07-08 RX ADMIN — SODIUM CHLORIDE: 9 INJECTION, SOLUTION INTRAVENOUS at 19:48

## 2021-07-08 RX ADMIN — TAZOBACTAM SODIUM AND PIPERACILLIN SODIUM 3.38 G: 375; 3 INJECTION, SOLUTION INTRAVENOUS at 01:07

## 2021-07-08 RX ADMIN — TAZOBACTAM SODIUM AND PIPERACILLIN SODIUM 3.38 G: 375; 3 INJECTION, SOLUTION INTRAVENOUS at 13:42

## 2021-07-08 RX ADMIN — TAZOBACTAM SODIUM AND PIPERACILLIN SODIUM 3.38 G: 375; 3 INJECTION, SOLUTION INTRAVENOUS at 19:47

## 2021-07-08 ASSESSMENT — ACTIVITIES OF DAILY LIVING (ADL)
ADLS_ACUITY_SCORE: 13

## 2021-07-08 NOTE — PROGRESS NOTES
Antimicrobial Stewardship Team Note    Antimicrobial Stewardship Program - A joint venture between [facility] and  Physicians to optimize antibiotic management.  NOT a formal consult - Restricted Antimicrobial Review     Patient: Keith Cooper  MRN: [id number]  Allergies: Percocet [oxycodone-acetaminophen] and Bees    Brief Summary: Keith Cooper is a 45 year old male who presents on 7/7/2021 with left lower quadrant abdominal pain which started on 7/6 and he is found to have diverticulitis of large intestine with perforation (contained) without bleeding.    HPI: Patient has no past medical history but presents with 2 days of  left lower quadrant with abdominal pain.  The pain was described as sharp, constant, with year, non radiating, aggravated by movement, relieved by pain medications. Never had this pain before. Deny any fever, nausea, vomiting or diarrhea.  On presentation and throughout hospitalization his vitals were/are stable other than mild hypertension and some tachycardia (HR ).  He was afebrile (Tmax 99)and WBC wnl (11.2)         Active Anti-infective Medications   (From admission, onward)                 Start     Stop    07/08/21 0100  piperacillin-tazobactam  3.375 g,   Intravenous,   100 mL/hr,   EVERY 6 HOURS     Intra-Abdominal Infection        --                  Assessment:   Acute diverticulitis. CT scan shows acute diverticulitis along the mid descending colon at the left flank with adjacent small bubbles of extraluminal gas posterior to the region of diverticulitis.  Surgery has been consulted and the plan is to continue conservative management of the acute diverticulitis with close monitoring for s/s of progression.  IV antibiotics were initiated with unasyn x1 then transition to IV zosyn.      Today is day 2 of antibiotics.  Given this patient is an otherwise community dweller with no significant PMH, no history of multi-drug resistant pathogens and no concern  for Pseudomonas in this type of infection, no evidence of ongoing perforation or abscess antibiotics can be de-escalated.  Ciprofloxacin and metronidazole were noted to be an option for transition to oral route, however, likely does not require the added spectrum ciprofloxacin provides over oral augmentin.     Recommendations:  Discontinue zosyn and re-initiate Unasyn 3 gm IV Q6H.  Transition to oral augmentin when able to take/tolerate oral medications.           Discussed with ID Staff Dr. Durga Mejía, PharmD, MPH, BCIDP  [phone]    Vital Signs/Clinical Features:  Vitals         07/06 0700  -  07/07 0659 07/07 0700  -  07/08 0659 07/08 0700  -  07/08 1618   Most Recent    Temp ( F)   98.3 -  99       98.3 (36.8)    Pulse   72 -  102       72    Resp   16 -  18       16    BP   102/62 -  140/83       102/62    SpO2 (%)   99 -  100       99            Labs  Estimated Creatinine Clearance: 126.7 mL/min (based on SCr of 0.72 mg/dL).  Recent Labs   Lab Test 07/07/21  1754 07/08/21  0554   CR 0.74 0.72       Recent Labs   Lab Test 07/07/21  1754   WBC 11.2*   ANEU 8.4*   ALYM 1.8   MAGNO 0.9   AEOS 0.0   HGB 14.1   HCT 42.2   MCV 97          Recent Labs   Lab Test 07/07/21  1754   BILITOTAL 0.7   ALKPHOS 50   ALBUMIN 4.1   AST 19   ALT 23                   Culture Results:  7-Day Micro Results       ** No results found for the last 168 hours. **            Recent Labs   Lab Test 07/07/21  1755   URINEPH 6.0   NITRITE Negative   LEUKEST Negative   WBCU 0                         Imaging: Ct Abdomen Pelvis W Contrast    Result Date: 7/7/2021  CT ABDOMEN PELVIS WITH CONTRAST 7/7/2021 6:09 PM CLINICAL HISTORY: Left upper quadrant abdominal pain. Left upper quadrant since yesterday. Did have a fall from ladder 4 days ago but landed on his right side. TECHNIQUE: CT scan of the abdomen and pelvis was performed following injection of IV contrast. Multiplanar reformats were obtained.  Dose reduction techniques were used. CONTRAST: 74mL Isovue-370 COMPARISON: None. FINDINGS: LOWER CHEST: Normal. HEPATOBILIARY: Normal. PANCREAS: Normal. SPLEEN: Normal. ADRENAL GLANDS: Normal. KIDNEYS/BLADDER: Normal. BOWEL: Severe focal inflammatory wall thickening consistent with acute diverticulitis along the mid descending colon at the left flank series 3 image 114 and adjacent images. There are adjacent small bubbles of extraluminal gas posterior to the region of diverticulitis, for example image 115. There is adjacent trace left pericolic gutter fluid, but no loculated abscess can be seen at this time. Remainder of the bowel shows no acute abnormality. No obstruction. Normal appendix. PELVIC ORGANS: Normal. ADDITIONAL FINDINGS: None. MUSCULOSKELETAL: Degenerative change along with flattened appearance of the bilateral femoral heads. No acute osseous abnormality otherwise seen.     IMPRESSION: 1.  Severe acute diverticulitis involving the mid descending colon at the left flank. There are adjacent small bubbles of extraluminal gas consistent with contained perforated diverticulitis. No drainable abscess seen at this time. 2.  No other acute abnormality. 3.  Flattened appearance of the bilateral femoral heads associated with bilateral hip DJD. JOAN DEVI MD   SYSTEM ID:  MATTHEW     Vital Signs/Clinical Features:  Vitals         07/06 0700  -  07/07 0659 07/07 0700  -  07/08 0659 07/08 0700  -  07/08 1613   Most Recent    Temp ( F)   98.3 -  99       98.3 (36.8)    Pulse   72 -  102       72    Resp   16 -  18       16    BP   102/62 -  140/83       102/62    SpO2 (%)   99 -  100       99            Labs  Estimated Creatinine Clearance: 126.7 mL/min (based on SCr of 0.72 mg/dL).  Recent Labs   Lab Test 07/07/21  1754 07/08/21  0554   CR 0.74 0.72       Recent Labs   Lab Test 07/07/21  1754   WBC 11.2*   ANEU 8.4*   ALYM 1.8   MAGNO 0.9   AEOS 0.0   HGB 14.1   HCT 42.2   MCV 97          Recent Labs   Lab Test  07/07/21  1754   BILITOTAL 0.7   ALKPHOS 50   ALBUMIN 4.1   AST 19   ALT 23     Culture Results:  7-Day Micro Results       ** No results found for the last 168 hours. **            Recent Labs   Lab Test 07/07/21  1755   URINEPH 6.0   NITRITE Negative   LEUKEST Negative   WBCU 0       Imaging: Ct Abdomen Pelvis W Contrast    Result Date: 7/7/2021  CT ABDOMEN PELVIS WITH CONTRAST 7/7/2021 6:09 PM CLINICAL HISTORY: Left upper quadrant abdominal pain. Left upper quadrant since yesterday. Did have a fall from ladder 4 days ago but landed on his right side. TECHNIQUE: CT scan of the abdomen and pelvis was performed following injection of IV contrast. Multiplanar reformats were obtained. Dose reduction techniques were used. CONTRAST: 74mL Isovue-370 COMPARISON: None. FINDINGS: LOWER CHEST: Normal. HEPATOBILIARY: Normal. PANCREAS: Normal. SPLEEN: Normal. ADRENAL GLANDS: Normal. KIDNEYS/BLADDER: Normal. BOWEL: Severe focal inflammatory wall thickening consistent with acute diverticulitis along the mid descending colon at the left flank series 3 image 114 and adjacent images. There are adjacent small bubbles of extraluminal gas posterior to the region of diverticulitis, for example image 115. There is adjacent trace left pericolic gutter fluid, but no loculated abscess can be seen at this time. Remainder of the bowel shows no acute abnormality. No obstruction. Normal appendix. PELVIC ORGANS: Normal. ADDITIONAL FINDINGS: None. MUSCULOSKELETAL: Degenerative change along with flattened appearance of the bilateral femoral heads. No acute osseous abnormality otherwise seen.     IMPRESSION: 1.  Severe acute diverticulitis involving the mid descending colon at the left flank. There are adjacent small bubbles of extraluminal gas consistent with contained perforated diverticulitis. No drainable abscess seen at this time. 2.  No other acute abnormality. 3.  Flattened appearance of the bilateral femoral heads associated with bilateral  hip DJD. JOAN DEVI MD   SYSTEM ID:  MATTHEW

## 2021-07-08 NOTE — UTILIZATION REVIEW
"    Admission Status; Secondary Review Determination         Under the authority of the Utilization Management Committee, the utilization review process indicated a secondary review on the above patient.  The review outcome is based on review of the medical records, discussions with staff, and applying clinical experience noted on the date of the review.        (X)      Inpatient Status Appropriate - This patient's medical care is consistent with medical management for inpatient care and reasonable inpatient medical practice.      () Observation Status Appropriate - This patient does not meet hospital inpatient criteria and is placed in observation status. If this patient's primary payer is Medicare and was admitted as an inpatient, Condition Code 44 should be used and patient status changed to \"observation\".   () Admission Status NOT Appropriate - This patient's medical care is not consistent with medical management for Inpatient or Observation Status.          RATIONALE FOR DETERMINATION     \"Keith Cooper is a 45 year old male who presents on 7/7/2021 with left lower quadrant abdominal pain since Tuesday. Pt noted to have diverticulitis of large intestine with perforation without bleeding.\"    The patient is on IVF's, NPO, IV zosyn and is likely to stay > 2 MN for IV abx given perforation and hence inpatient status is appropriate.      The severity of illness, intensity of service provided, expected LOS and risk for adverse outcome make the care complex, high risk and appropriate for hospital admission.        The information on this document is developed by the utilization review team in order for the business office to ensure compliance.  This only denotes the appropriateness of proper admission status and does not reflect the quality of care rendered.         The definitions of Inpatient Status and Observation Status used in making the determination above are those provided in the CMS Coverage Manual, " Chapter 1 and Chapter 6, section 70.4.      Sincerely,     RORY MCKOY MD    Physician Advisor  Utilization Review/ Case Management  [facility].

## 2021-07-08 NOTE — CONSULTS
Patient seen in consultation for acute diverticulitis with contained microperforation    HPI:  Patient is a 45 year old male with 2 days of rid mid abdominal pain. He has been having normal bowel movements prior to admission. He had decreased appetite as well. He endorses occasional blood streaking in his stool. He denies any family history of colon cancer. In the ED he was found to have leukocytosis and CT showing descending diverticulitis with microperforation, no free air or abscess. He has never had a colonoscopy. Today he feels slightly better but he's also been getting pain medication.    Review Of Systems    Skin: negative  Ears/Nose/Throat: negative  Respiratory: No shortness of breath, dyspnea on exertion, cough, or hemoptysis  Cardiovascular: negative  Gastrointestinal: as above  Genitourinary: negative  Musculoskeletal: negative  Neurologic: negative  Hematologic/Lymphatic/Immunologic: negative  Endocrine: negative      No past medical history on file.    Past Surgical History:   Procedure Laterality Date     EYE SURGERY       ORTHOPEDIC SURGERY         No family history on file.    Social History     Socioeconomic History     Marital status: Single     Spouse name: Not on file     Number of children: Not on file     Years of education: Not on file     Highest education level: Not on file   Occupational History     Not on file   Social Needs     Financial resource strain: Not on file     Food insecurity     Worry: Not on file     Inability: Not on file     Transportation needs     Medical: Not on file     Non-medical: Not on file   Tobacco Use     Smoking status: Never Smoker     Smokeless tobacco: Current User   Substance and Sexual Activity     Alcohol use: Yes     Drug use: No     Sexual activity: Yes     Partners: Female   Lifestyle     Physical activity     Days per week: Not on file     Minutes per session: Not on file     Stress: Not on file   Relationships     Social connections     Talks on  "phone: Not on file     Gets together: Not on file     Attends Confucianism service: Not on file     Active member of club or organization: Not on file     Attends meetings of clubs or organizations: Not on file     Relationship status: Not on file     Intimate partner violence     Fear of current or ex partner: Not on file     Emotionally abused: Not on file     Physically abused: Not on file     Forced sexual activity: Not on file   Other Topics Concern     Not on file   Social History Narrative     Not on file       No current outpatient medications on file.       Medications and history reviewed    Physical exam:  Vitals: /62 (BP Location: Left arm)   Pulse 72   Temp 98.3  F (36.8  C) (Oral)   Resp 16   Ht 1.651 m (5' 5\")   Wt 68.4 kg (150 lb 12.8 oz)   SpO2 99%   BMI 25.09 kg/m    BMI= Body mass index is 25.09 kg/m .    Constitutional: Healthy, alert, non-distressed   Head: Normo-cephalic, atraumatic, no lesions, masses or tenderness   Cardiovascular: RRR, no new murmurs, +S1, +S2 heart sounds, no clicks, rubs or gallops   Respiratory: CTAB, no rales, rhonchi or wheezing, equal chest rise, good respiratory effort   Gastrointestinal: Soft, +ttp left mid abdomen, non acute, non distended, no rebound rigidity or guarding, no masses or hernias palpated   : Deferred  Musculoskeletal: Moves all extremities, normal  strength, no deformities noted   Skin: No suspicious lesions or rashes   Psychiatric: Mentation appears normal, affect appropriate   Hematologic/Lymphatic/Immunologic: Normal cervical and supraclavicular lymph nodes   Patient able to get up on table without difficulty.    Labs show:  Results for orders placed or performed during the hospital encounter of 07/07/21 (from the past 24 hour(s))   CBC with platelets differential   Result Value Ref Range    WBC 11.2 (H) 4.0 - 11.0 10e9/L    RBC Count 4.34 (L) 4.4 - 5.9 10e12/L    Hemoglobin 14.1 13.3 - 17.7 g/dL    Hematocrit 42.2 40.0 - 53.0 %    " MCV 97 78 - 100 fl    MCH 32.5 26.5 - 33.0 pg    MCHC 33.4 31.5 - 36.5 g/dL    RDW 15.0 10.0 - 15.0 %    Platelet Count 223 150 - 450 10e9/L    Diff Method Automated Method     % Neutrophils 75.2 %    % Lymphocytes 15.9 %    % Monocytes 8.0 %    % Eosinophils 0.3 %    % Basophils 0.2 %    % Immature Granulocytes 0.4 %    Nucleated RBCs 0 0 /100    Absolute Neutrophil 8.4 (H) 1.6 - 8.3 10e9/L    Absolute Lymphocytes 1.8 0.8 - 5.3 10e9/L    Absolute Monocytes 0.9 0.0 - 1.3 10e9/L    Absolute Eosinophils 0.0 0.0 - 0.7 10e9/L    Absolute Basophils 0.0 0.0 - 0.2 10e9/L    Abs Immature Granulocytes 0.0 0 - 0.4 10e9/L    Absolute Nucleated RBC 0.0    Comprehensive metabolic panel   Result Value Ref Range    Sodium 135 133 - 144 mmol/L    Potassium 3.4 3.4 - 5.3 mmol/L    Chloride 100 94 - 109 mmol/L    Carbon Dioxide 30 20 - 32 mmol/L    Anion Gap 5 3 - 14 mmol/L    Glucose 97 70 - 99 mg/dL    Urea Nitrogen 8 7 - 30 mg/dL    Creatinine 0.74 0.66 - 1.25 mg/dL    GFR Estimate >90 >60 mL/min/[1.73_m2]    GFR Estimate If Black >90 >60 mL/min/[1.73_m2]    Calcium 8.9 8.5 - 10.1 mg/dL    Bilirubin Total 0.7 0.2 - 1.3 mg/dL    Albumin 4.1 3.4 - 5.0 g/dL    Protein Total 8.7 6.8 - 8.8 g/dL    Alkaline Phosphatase 50 40 - 150 U/L    ALT 23 0 - 70 U/L    AST 19 0 - 45 U/L   Lipase   Result Value Ref Range    Lipase 72 (L) 73 - 393 U/L   UA with Microscopic   Result Value Ref Range    Color Urine Straw     Appearance Urine Clear     Glucose Urine Negative NEG^Negative mg/dL    Bilirubin Urine Negative NEG^Negative    Ketones Urine Negative NEG^Negative mg/dL    Specific Gravity Urine 1.003 1.003 - 1.035    Blood Urine Negative NEG^Negative    pH Urine 6.0 5.0 - 7.0 pH    Protein Albumin Urine Negative NEG^Negative mg/dL    Urobilinogen mg/dL 0.0 0.0 - 2.0 mg/dL    Nitrite Urine Negative NEG^Negative    Leukocyte Esterase Urine Negative NEG^Negative    Source Midstream Urine     WBC Urine 0 0 - 5 /HPF    RBC Urine 0 0 - 2 /HPF   CT  ABDOMEN PELVIS W CONTRAST    Narrative    CT ABDOMEN PELVIS WITH CONTRAST 7/7/2021 6:09 PM    CLINICAL HISTORY: Left upper quadrant abdominal pain. Left upper  quadrant since yesterday. Did have a fall from ladder 4 days ago but  landed on his right side.    TECHNIQUE: CT scan of the abdomen and pelvis was performed following  injection of IV contrast. Multiplanar reformats were obtained. Dose  reduction techniques were used.  CONTRAST: 74mL Isovue-370    COMPARISON: None.    FINDINGS:   LOWER CHEST: Normal.    HEPATOBILIARY: Normal.    PANCREAS: Normal.    SPLEEN: Normal.    ADRENAL GLANDS: Normal.    KIDNEYS/BLADDER: Normal.    BOWEL: Severe focal inflammatory wall thickening consistent with acute  diverticulitis along the mid descending colon at the left flank series  3 image 114 and adjacent images. There are adjacent small bubbles of  extraluminal gas posterior to the region of diverticulitis, for  example image 115. There is adjacent trace left pericolic gutter  fluid, but no loculated abscess can be seen at this time. Remainder of  the bowel shows no acute abnormality. No obstruction. Normal appendix.    PELVIC ORGANS: Normal.    ADDITIONAL FINDINGS: None.    MUSCULOSKELETAL: Degenerative change along with flattened appearance  of the bilateral femoral heads. No acute osseous abnormality otherwise  seen.      Impression    IMPRESSION:   1.  Severe acute diverticulitis involving the mid descending colon at  the left flank. There are adjacent small bubbles of extraluminal gas  consistent with contained perforated diverticulitis. No drainable  abscess seen at this time.  2.  No other acute abnormality.  3.  Flattened appearance of the bilateral femoral heads associated  with bilateral hip DJD.    JOAN DEVI MD         SYSTEM ID:  MATTHEW   Asymptomatic SARS-CoV-2 COVID-19 Virus (Coronavirus) by PCR    Specimen: Nasopharyngeal   Result Value Ref Range    SARS-CoV-2 Virus Specimen Source No     SARS-CoV-2 PCR Result  NEGATIVE     SARS-CoV-2 PCR Comment (Note)    Basic metabolic panel   Result Value Ref Range    Sodium 140 133 - 144 mmol/L    Potassium 3.5 3.4 - 5.3 mmol/L    Chloride 108 94 - 109 mmol/L    Carbon Dioxide 26 20 - 32 mmol/L    Anion Gap 6 3 - 14 mmol/L    Glucose 74 70 - 99 mg/dL    Urea Nitrogen 7 7 - 30 mg/dL    Creatinine 0.72 0.66 - 1.25 mg/dL    GFR Estimate >90 >60 mL/min/[1.73_m2]    GFR Estimate If Black >90 >60 mL/min/[1.73_m2]    Calcium 7.6 (L) 8.5 - 10.1 mg/dL       Imaging shows:  As above    Assessment:     ICD-10-CM    1. Diverticulitis of large intestine with perforation without bleeding  K57.20 Asymptomatic SARS-CoV-2 COVID-19 Virus (Coronavirus) by PCR     Basic metabolic panel     Plan: I recommend conservative mgmt for acute diverticulitis. NPO, IVF, antibiotics. We discussed his management and the plan to do diagnostic colonoscopy in 6-8 weeks following resolution of his current active diverticulitis. Should he not improve or clinically decline, re-imaging or even surgery may still be necessary.  Rocco Lindsey, DO

## 2021-07-08 NOTE — H&P
Hospitalist History and Physical     Name: Keith Cooper  MRN: [id number]  [facility]: [id number]  Admission Date/Time: 7/7/2021  5:21 PM  Primary Care Provider / Referring Physician:  Sophie Sebastian     Principal Problem:   Diverticulitis of large intestine with perforation without bleeding    Assessment/Plan:   Keiht Cooper is a 45 year old male who presents on 7/7/2021 with left lower quadrant abdominal pain since Tuesday.    # Acute diverticulitis. CT scan shows acute diverticulitis along the mid descending colon at the left flank with adjacent small bubbles of extraluminal gas posterior to the region of diverticulitis. Laboratory unremarkable, vital signs stable.  - NPO except meds  - fluid rehydration and monitor electrolytes  - supportive measures with pain control and antiemetics p.r.n.  - IV antibiotics with Zosyn   - follow-up on the blood cultures  - once tolerated his p.o. Will switch his IV antibiotics to oral  - monitor for bowel perforation (sign of peritonitis, severe abdominal pain, and pt becoming more toxic and clinically deteriorating)   - surgical consult initiated from the ED    Diet:nothing by mouth  DVT Prophylaxis: Low Risk/Ambulatory with no VTE prophylaxis indicated  Code Status: Full Code  Randhawa Catheter: not present   Disposition Plan: Expected discharge: Tomorrow, recommended to prior living arrangement once adequate pain management/ tolerating PO medications.  Entered:  Mihai Reynolds MD 7/7/2021, 9:29 PM    The patient's care was discussed with the Bedside Nurse and Patient.  Mihai Reynolds MD  Tele-Hospitalist Service  [facility]  Contact information available via [facility] Paging/Directory  Chief Complaint:     Chief Complaint   Patient presents with     Flank Pain       History of Presenting Illness:   Keith Cooper is a 45 year old male with no past medical history now presents on 7/7/2021 with left lower quadrant with abdominal pains though.  The pain was  "described as sharp, constant, with year, non radiating, aggravated by movement, relieved by pain medications. Never had this pain before. Deny any fever, nausea, vomiting or diarrhea.  His last bowel movement was today. Denies any abdominal surgery oral colonoscopy in the past.  Social history and family history unremarkable.  In theER of the patient was diagnosed with acute diverticulitis and was started on antibiotics, fluids and pain medications.  Surgery was consulted over the phone.    Past Medical History:   No past medical history on file.      Past Surgical History:   Keith  has a past surgical history that includes Eye surgery and orthopedic surgery.     Social History:   Keith  reports that he has never smoked. He uses smokeless tobacco. He reports current alcohol use. He reports that he does not use drugs.    Family History:   Keith's family history is not on file.    Allergies:   Allergies have been reviewed. Keith is allergic to percocet [oxycodone-acetaminophen] and bees.    Prior to Admission Medications:     Medications Prior to Admission   Medication Sig Dispense Refill Last Dose     fexofenadine (ALLEGRA) 180 MG tablet Take 180 mg by mouth daily.   More than a month at Unknown time       Review of Systems:   A 14 point comprehensive review of system was performed as per the history of presenting illness, otherwise essentially unremarkable.     Vitals:   Blood pressure 114/67, pulse 82, temperature 99  F (37.2  C), temperature source Oral, resp. rate 18, height 1.651 m (5' 5\"), weight 68.4 kg (150 lb 12.8 oz), SpO2 100 %.  Body mass index is 25.09 kg/m .  First recorded vital signs:  Temp: 98.9  F (37.2  C) - BP: (!) 140/83 - Pulse: 102 - Resp: 18 - SpO2: 99 %      Most recent vital signs:  Temp: 99  F (37.2  C) - BP: 114/67 - Pulse: 82 - Resp: 18 - SpO2: 100 %   - Body mass index is 25.09 kg/m .     Physical Exam:    General: the patient is a well-developed, well-nourished male in no apparent " distress.  Head: atraumatic  Eyes: extraocular movements intact, pupils are reactive to light bilateral from 4 mm to 2 mm  ENT: nares patent, moist mucous membranes, no oral or pharyngeal lesions  Neck: no thyroid goiter or mass, no carotid bruits, neck has normal flexion and extension  Respiratory: patient is breathing comfortably, breath sounds are equal bilateral, no wheezes, no crackles on auscultation exam. No chest wall tenderness  Cardiovascular: regular rate and rhythm, no murmurs, rubs, or gallops heard,   Abdomen: audible bowel sounds present, there is no moderate tenderness to palpation in the left lower quadrant without any rebound, liver and spleen not palpated  Skin: clear without significant rashes or lesions  Neuro: grossly intact, alert and oriented x3  MSK: No pitting edema or joint swelling    Labs:    CMP:  Recent Labs   Lab Test 07/07/21  1754      CO2 30   BUN 8   ALBUMIN 4.1   ALKPHOS 50   AST 19   ALT 23   ANIONGAP 5   LIPASE 72*     CBC:  Recent Labs   Lab Test 07/07/21  1754   WBC 11.2*   HGB 14.1        Coags::  No results for input(s): INR in the last 62733 hours.  Troponin:    No lab results found.  INR:    No lab results found.  D-DIMER:  No results found for: DIMER  BNP:  No results found for: BNP  UA:  Recent Labs   Lab 07/07/21  1755   COLOR Straw   APPEARANCE Clear   URINEGLC Negative   URINEBILI Negative   URINEKETONE Negative   SG 1.003   UBLD Negative   URINEPH 6.0   PROTEIN Negative   NITRITE Negative   LEUKEST Negative   RBCU 0   WBCU 0     Lactic Acid:    No results found for: LACT      ABG:  -No lab results found in last 7 days.  Imaging:   Ct Abdomen Pelvis W Contrast    Result Date: 7/7/2021  CT ABDOMEN PELVIS WITH CONTRAST 7/7/2021 6:09 PM CLINICAL HISTORY: Left upper quadrant abdominal pain. Left upper quadrant since yesterday. Did have a fall from ladder 4 days ago but landed on his right side. TECHNIQUE: CT scan of the abdomen and pelvis was performed  following injection of IV contrast. Multiplanar reformats were obtained. Dose reduction techniques were used. CONTRAST: 74mL Isovue-370 COMPARISON: None. FINDINGS: LOWER CHEST: Normal. HEPATOBILIARY: Normal. PANCREAS: Normal. SPLEEN: Normal. ADRENAL GLANDS: Normal. KIDNEYS/BLADDER: Normal. BOWEL: Severe focal inflammatory wall thickening consistent with acute diverticulitis along the mid descending colon at the left flank series 3 image 114 and adjacent images. There are adjacent small bubbles of extraluminal gas posterior to the region of diverticulitis, for example image 115. There is adjacent trace left pericolic gutter fluid, but no loculated abscess can be seen at this time. Remainder of the bowel shows no acute abnormality. No obstruction. Normal appendix. PELVIC ORGANS: Normal. ADDITIONAL FINDINGS: None. MUSCULOSKELETAL: Degenerative change along with flattened appearance of the bilateral femoral heads. No acute osseous abnormality otherwise seen.     IMPRESSION: 1.  Severe acute diverticulitis involving the mid descending colon at the left flank. There are adjacent small bubbles of extraluminal gas consistent with contained perforated diverticulitis. No drainable abscess seen at this time. 2.  No other acute abnormality. 3.  Flattened appearance of the bilateral femoral heads associated with bilateral hip DJD. JOAN DEVI MD   SYSTEM ID:  MATTHEW      Visit/Communication Style   Virtual (Video) communication was used to evaluate Keith.  Keith consented to the use of video communication: yes  Video START time: 0900, 7/7/2021  Video STOP time: 0925, 7/7/2021   Patient's location: [facility]   Provider's location during the visit: [facility] Tele-medicine site        Aleksey Reynolds DO  7/7/2021  9:29 PM    I performed this consultation using real-time telehealth tools, including a live video connection between my location and the patient's location.    As the provider for this  telehealth service, I attest that I introduced myself to the patient, provided my credentials, disclosed my location, and determined that, based on a review of the patients chart and/or a discussion with members of the patient's treatment team, telemedicine via a real-time, two-way, interactive audio and video platform is an appropriate and effective means of providing this service. The patient and I mutually agree that this visit is appropriate for telemedicine as well.    Disclaimer Note: To increase efficiency, your provider may have prepared this document using voice recognition technology. In that case, if a word or phrase is confusing, or does not make sense, this is likely due to a recognition error within the program which was not discovered during the provider s review. If you believe an error has occurred, please notify your provider s office at your earliest convenience, so we can correct any mistakes.

## 2021-07-08 NOTE — PROGRESS NOTES
[facility]    Medicine Progress Note - Hospitalist Service       Date of Admission:  7/7/2021  Identifier:  45-year-old man with no significant past medical history was admitted yesterday with complaints of abdominal pain for the preceding 2 days.  Work-up revealed the patient to have evidence of elevated WBC count and acute diverticulitis along the mid descending colon with small self-contained microperforation.  He was started on IV fluids, antibiotics and admitted to the hospital.  Evaluation by general surgery was undertaken this morning.  Patient has done remarkably well has required virtually no pain medications for the last several hours.      Assessment & Plan           1/.  Acute diverticulitis with microperforation, self-contained, no abscess, no evidence of sepsis.  Antibiotics to continue at discharge can be switched over to ciprofloxacin and metronidazole.  Decrease IV fluids to 75 mL an hour, start clear liquid diet.  Anticipate ongoing improvement.  Do not anticipate surgical intervention.  Encourage ambulation.  Will require a fiber restricted diet till seen by general surgery/GI as an outpatient.  Check labs in the morning.         Diet: NPO for Medical/Clinical Reasons Except for: Meds, Ice Chips    DVT Prophylaxis: Low Risk/Ambulatory with no VTE prophylaxis indicated  Randhawa Catheter: Not present  Central Lines: None  Code Status: Full Code      Disposition Plan   Expected discharge: Tomorrow, recommended to prior living arrangement once Clinical goals are achieved.     The patient's care was discussed with the Bedside Nurse, Care Coordinator/ and Patient.    CHERELLE Tobin  Hospitalist Service  [facility]  Securely message with the Vocera Web Console (learn more here)  Text page via [employer] Paging/Directory      Risk Factors Present on Admission          # Hypocalcemia: Ca = 7.6 mg/dL (Ref range: 8.5 - 10.1 mg/dL)  and/or iCa = N/A on admission, will replace as needed          ______________________________________________________________________    Interval History   Overnight patient has done quite well as mentioned above, he is been ambulating without any difficulty, he is beginning to get restless and desires to leave the hospital the soonest that he can so that he can go on with life.      Data reviewed today: I reviewed all medications, new labs and imaging results over the last 24 hours. I personally reviewed no images or EKG's today.    Physical Exam   Vital Signs: Temp: 98.3  F (36.8  C) Temp src: Oral BP: 102/62 Pulse: 72   Resp: 16 SpO2: 99 % O2 Device: None (Room air)    Weight: 150 lbs 12.8 oz  General Appearance: Alert awake oriented x3 pleasant in no acute distress    Respiratory: Clear to auscultation  Cardiovascular: S1-S2 regular rate and rhythm  GI: Soft, nontender nondistended bowel sounds are present minimal voluntary guarding in the mid abdomen  Skin: No rashes or lesions  Other: No obvious neurological deficit, gait is steady  PERRLA  No lower extremity swelling is noted    Data   Recent Labs   Lab 07/08/21  0554 07/07/21  1754   WBC  --  11.2*   HGB  --  14.1   MCV  --  97   PLT  --  223    135   POTASSIUM 3.5 3.4   CHLORIDE 108 100   CO2 26 30   BUN 7 8   CR 0.72 0.74   ANIONGAP 6 5   LINA 7.6* 8.9   GLC 74 97   ALBUMIN  --  4.1   PROTTOTAL  --  8.7   BILITOTAL  --  0.7   ALKPHOS  --  50   ALT  --  23   AST  --  19   LIPASE  --  72*

## 2021-07-09 VITALS
TEMPERATURE: 98.5 F | OXYGEN SATURATION: 100 % | HEART RATE: 70 BPM | HEIGHT: 65 IN | BODY MASS INDEX: 25.12 KG/M2 | SYSTOLIC BLOOD PRESSURE: 109 MMHG | RESPIRATION RATE: 16 BRPM | DIASTOLIC BLOOD PRESSURE: 66 MMHG | WEIGHT: 150.8 LBS

## 2021-07-09 LAB
ALBUMIN SERPL-MCNC: 2.7 G/DL (ref 3.4–5)
ALP SERPL-CCNC: 37 U/L (ref 40–150)
ALT SERPL W P-5'-P-CCNC: 15 U/L (ref 0–70)
ANION GAP SERPL CALCULATED.3IONS-SCNC: 2 MMOL/L (ref 3–14)
AST SERPL W P-5'-P-CCNC: 14 U/L (ref 0–45)
BASOPHILS # BLD AUTO: 0 10E9/L (ref 0–0.2)
BASOPHILS NFR BLD AUTO: 0.2 %
BILIRUB SERPL-MCNC: 0.6 MG/DL (ref 0.2–1.3)
BUN SERPL-MCNC: 5 MG/DL (ref 7–30)
CALCIUM SERPL-MCNC: 7.5 MG/DL (ref 8.5–10.1)
CHLORIDE SERPL-SCNC: 110 MMOL/L (ref 94–109)
CO2 SERPL-SCNC: 29 MMOL/L (ref 20–32)
CREAT SERPL-MCNC: 0.74 MG/DL (ref 0.66–1.25)
DIFFERENTIAL METHOD BLD: ABNORMAL
EOSINOPHIL # BLD AUTO: 0.1 10E9/L (ref 0–0.7)
EOSINOPHIL NFR BLD AUTO: 1.9 %
ERYTHROCYTE [DISTWIDTH] IN BLOOD BY AUTOMATED COUNT: 14.6 % (ref 10–15)
GFR SERPL CREATININE-BSD FRML MDRD: >90 ML/MIN/{1.73_M2}
GLUCOSE SERPL-MCNC: 79 MG/DL (ref 70–99)
HCT VFR BLD AUTO: 36.8 % (ref 40–53)
HGB BLD-MCNC: 12.1 G/DL (ref 13.3–17.7)
IMM GRANULOCYTES # BLD: 0 10E9/L (ref 0–0.4)
IMM GRANULOCYTES NFR BLD: 0.4 %
LYMPHOCYTES # BLD AUTO: 1.4 10E9/L (ref 0.8–5.3)
LYMPHOCYTES NFR BLD AUTO: 26.8 %
MCH RBC QN AUTO: 32.1 PG (ref 26.5–33)
MCHC RBC AUTO-ENTMCNC: 32.9 G/DL (ref 31.5–36.5)
MCV RBC AUTO: 98 FL (ref 78–100)
MONOCYTES # BLD AUTO: 0.5 10E9/L (ref 0–1.3)
MONOCYTES NFR BLD AUTO: 10.1 %
NEUTROPHILS # BLD AUTO: 3.2 10E9/L (ref 1.6–8.3)
NEUTROPHILS NFR BLD AUTO: 60.6 %
NRBC # BLD AUTO: 0 10*3/UL
NRBC BLD AUTO-RTO: 0 /100
PLATELET # BLD AUTO: 174 10E9/L (ref 150–450)
POTASSIUM SERPL-SCNC: 3.9 MMOL/L (ref 3.4–5.3)
PROT SERPL-MCNC: 6.2 G/DL (ref 6.8–8.8)
RBC # BLD AUTO: 3.77 10E12/L (ref 4.4–5.9)
SODIUM SERPL-SCNC: 141 MMOL/L (ref 133–144)
WBC # BLD AUTO: 5.3 10E9/L (ref 4–11)

## 2021-07-09 PROCEDURE — 250N000013 HC RX MED GY IP 250 OP 250 PS 637: Performed by: INTERNAL MEDICINE

## 2021-07-09 PROCEDURE — 250N000011 HC RX IP 250 OP 636: Performed by: INTERNAL MEDICINE

## 2021-07-09 PROCEDURE — 80053 COMPREHEN METABOLIC PANEL: CPT | Performed by: INTERNAL MEDICINE

## 2021-07-09 PROCEDURE — 36415 COLL VENOUS BLD VENIPUNCTURE: CPT | Performed by: INTERNAL MEDICINE

## 2021-07-09 PROCEDURE — 99231 SBSQ HOSP IP/OBS SF/LOW 25: CPT | Performed by: SURGERY

## 2021-07-09 PROCEDURE — 85025 COMPLETE CBC W/AUTO DIFF WBC: CPT | Performed by: INTERNAL MEDICINE

## 2021-07-09 RX ADMIN — TAZOBACTAM SODIUM AND PIPERACILLIN SODIUM 3.38 G: 375; 3 INJECTION, SOLUTION INTRAVENOUS at 00:06

## 2021-07-09 RX ADMIN — TAZOBACTAM SODIUM AND PIPERACILLIN SODIUM 3.38 G: 375; 3 INJECTION, SOLUTION INTRAVENOUS at 06:38

## 2021-07-09 RX ADMIN — AMOXICILLIN AND CLAVULANATE POTASSIUM 1 TABLET: 875; 125 TABLET, FILM COATED ORAL at 10:40

## 2021-07-09 RX ADMIN — KETOROLAC TROMETHAMINE 15 MG: 15 INJECTION, SOLUTION INTRAMUSCULAR; INTRAVENOUS at 09:47

## 2021-07-09 RX ADMIN — KETOROLAC TROMETHAMINE 15 MG: 15 INJECTION, SOLUTION INTRAMUSCULAR; INTRAVENOUS at 00:06

## 2021-07-09 ASSESSMENT — ACTIVITIES OF DAILY LIVING (ADL)
ADLS_ACUITY_SCORE: 13

## 2021-07-09 NOTE — PLAN OF CARE
"Diet advanced to clear liquids, reports LLQ pain \"tolerable\" 3/10. Received Toradol x1./66 (BP Location: Left arm)   Pulse 73   Temp 98.9  F (37.2  C) (Oral)   Resp 16   Ht 1.651 m (5' 5\")   Wt 68.4 kg (150 lb 12.8 oz)   SpO2 99%   BMI 25.09 kg/m   up ambulating in halls independently. Will continue with POC.  "

## 2021-07-09 NOTE — PLAN OF CARE
S-(situation): shift note    B-(background): Diverticulitis    A-(assessment): Pt is A&O.  VSS.  Afebrile.  Denies pain if not moving but when he gets up he has pain in the left abdominal region.  Is independent in ambulating. Positive BS in all quads.    R-(recommendations): Will cont to monitor the above.  Possible discharge to home today.

## 2021-07-09 NOTE — PROGRESS NOTES
"General Surgery    S: Feeling well. Some pain at times but much improved overall. Having bowel movements and urinating a lot. Tolerating liquid diet.    O:  Vital signs:  Temp: 98.5  F (36.9  C) Temp src: Oral BP: 109/66 Pulse: 70   Resp: 16 SpO2: 100 % O2 Device: None (Room air)   Height: 165.1 cm (5' 5\") Weight: 68.4 kg (150 lb 12.8 oz)  Estimated body mass index is 25.09 kg/m  as calculated from the following:    Height as of this encounter: 1.651 m (5' 5\").    Weight as of this encounter: 68.4 kg (150 lb 12.8 oz).      Gen: AAOx3, NAD  Heart: RRR  Lungs: CTA  Abd: Soft, non-distended. mildy-tender left mid abdomen. Bowel sounds present    Labs/Imaging  Results for orders placed or performed during the hospital encounter of 07/07/21 (from the past 24 hour(s))   CBC with platelets differential   Result Value Ref Range    WBC 5.3 4.0 - 11.0 10e9/L    RBC Count 3.77 (L) 4.4 - 5.9 10e12/L    Hemoglobin 12.1 (L) 13.3 - 17.7 g/dL    Hematocrit 36.8 (L) 40.0 - 53.0 %    MCV 98 78 - 100 fl    MCH 32.1 26.5 - 33.0 pg    MCHC 32.9 31.5 - 36.5 g/dL    RDW 14.6 10.0 - 15.0 %    Platelet Count 174 150 - 450 10e9/L    Diff Method Automated Method     % Neutrophils 60.6 %    % Lymphocytes 26.8 %    % Monocytes 10.1 %    % Eosinophils 1.9 %    % Basophils 0.2 %    % Immature Granulocytes 0.4 %    Nucleated RBCs 0 0 /100    Absolute Neutrophil 3.2 1.6 - 8.3 10e9/L    Absolute Lymphocytes 1.4 0.8 - 5.3 10e9/L    Absolute Monocytes 0.5 0.0 - 1.3 10e9/L    Absolute Eosinophils 0.1 0.0 - 0.7 10e9/L    Absolute Basophils 0.0 0.0 - 0.2 10e9/L    Abs Immature Granulocytes 0.0 0 - 0.4 10e9/L    Absolute Nucleated RBC 0.0    Comprehensive metabolic panel   Result Value Ref Range    Sodium 141 133 - 144 mmol/L    Potassium 3.9 3.4 - 5.3 mmol/L    Chloride 110 (H) 94 - 109 mmol/L    Carbon Dioxide 29 20 - 32 mmol/L    Anion Gap 2 (L) 3 - 14 mmol/L    Glucose 79 70 - 99 mg/dL    Urea Nitrogen 5 (L) 7 - 30 mg/dL    Creatinine 0.74 0.66 - 1.25 " mg/dL    GFR Estimate >90 >60 mL/min/[1.73_m2]    GFR Estimate If Black >90 >60 mL/min/[1.73_m2]    Calcium 7.5 (L) 8.5 - 10.1 mg/dL    Bilirubin Total 0.6 0.2 - 1.3 mg/dL    Albumin 2.7 (L) 3.4 - 5.0 g/dL    Protein Total 6.2 (L) 6.8 - 8.8 g/dL    Alkaline Phosphatase 37 (L) 40 - 150 U/L    ALT 15 0 - 70 U/L    AST 14 0 - 45 U/L           A: Acute descending diverticulitis with microperforation    P: clinically improving. ADAT to low residue. Will have nutrition educated patient on this diet for 6 weeks. Follow up with me for discussion of diagnostic colonoscopy. Home when tolerating solids with oral antibiotics

## 2021-07-09 NOTE — PLAN OF CARE
S-(situation): Patient discharged to home via ambulatory to the door per request with wife.    B-(background):Diverticulitis with microperforation    A-(assessment): No pain. Tolerated full liquid lunch without pain or nausea. Ambulating in halls independently. VSS Voiding and having formed stools. WBC was 5.3 today.     R-(recommendations): Discharge instructions reviewed with patient. Listed belongings gathered and returned to patient. Will follow up with DR. Lindsey in 6 weeks.        Discharge Nursing Criteria:     Care Plan and Patient education resolved: Yes    New Medications- pt has been educated about purpose and side effects: Yes    Vaccines  Influenza status verified at discharge:  Not Applicable, not in season    MISC  Prescriptions if needed, hard copies sent with patient  Yes  Home medications returned to patient: NA  Medication Bin checked and emptied on discharge Yes  Patient reports post-discharge pain management plan is effective: Yes

## 2021-07-09 NOTE — DISCHARGE SUMMARY
JOSE [facility]ADALUPE [facility]  Hospitalist Discharge Summary      Date of Admission:  7/7/2021  Date of Discharge:  7/9/2021  Discharging Provider: CHERELLE Tobin      Discharge Diagnoses   Acute diverticulitis of the large colon with microperforation and no abscess formation  Acute abdominal pain secondary to above reason    Follow-ups Needed After Discharge   Follow-up with surgeon Dr. Lindsey in 4-6 weeks        Discharge Disposition   Discharged to home  Condition at discharge: Good      Hospital Course        45-year-old man with no significant past medical history was admitted yesterday with complaints of abdominal pain for the preceding 2 days.  Work-up revealed the patient to have evidence of elevated WBC count and acute diverticulitis along the mid descending colon with small self-contained microperforation.  He was started on IV fluids, antibiotics and admitted to the hospital.  Evaluation by general surgery was undertaken this morning.  Patient has done remarkably well has required virtually no pain medications for previous 48 hours prior to discharge.  Patient was originally as mentioned on IV antibiotics subsequently was switched over to oral antibiotics, his diet was gradually increased and he has done remarkably well.  He is to follow-up with general surgery as an outpatient for possible colonoscopy in about 6 weeks time.  He must pursue a low fiber/low residue diet.  Activity restrictions have been placed for the next week or so.             Consultations This Hospital Stay   SURGERY GENERAL IP CONSULT    Code Status   Full Code    Time Spent on this Encounter   IBri MBBS, personally saw the patient today and spent greater than 30 minutes discharging this patient.       CHERELLE Tobin [facility] MEDICAL SURGICAL  [address] [place] [address]  Phone:  [phone]  ______________________________________________________________________    Physical Exam   Vital Signs: Temp: 98.5  F (36.9  C) Temp src: Oral BP: 109/66 Pulse: 70   Resp: 16 SpO2: 100 % O2 Device: None (Room air)    Weight: 150 lbs 12.8 oz  General Appearance:  Alert awake oriented x3 pleasant in no acute distress     Respiratory: Clear to auscultation  Cardiovascular: S1-S2 regular rate and rhythm  GI: Soft, nontender nondistended bowel sounds are present minimal voluntary guarding in the mid abdomen  Skin: No rashes or lesions  Other:  No obvious neurological deficit, gait is steady  PERRLA  No lower extremity swelling is noted       Primary Care Physician   Sophie Sebastian    Discharge Orders   No discharge procedures on file.    Significant Results and Procedures   Most Recent 3 CBC's:  Recent Labs   Lab Test 07/09/21  0550 07/07/21  1754   WBC 5.3 11.2*   HGB 12.1* 14.1   MCV 98 97    223     Most Recent 3 BMP's:  Recent Labs   Lab Test 07/09/21  0550 07/08/21  0554 07/07/21  1754    140 135   POTASSIUM 3.9 3.5 3.4   CHLORIDE 110* 108 100   CO2 29 26 30   BUN 5* 7 8   CR 0.74 0.72 0.74   ANIONGAP 2* 6 5   LINA 7.5* 7.6* 8.9   GLC 79 74 97       Discharge Medications   Current Discharge Medication List      STOP taking these medications       fexofenadine (ALLEGRA) 180 MG tablet Comments:   Reason for Stopping:             Allergies   Allergies   Allergen Reactions     Percocet [Oxycodone-Acetaminophen] Itching     Bees Rash

## 2021-07-09 NOTE — CONSULTS
"CLINICAL NUTRITION SERVICES - EDUCATION NOTE    Received diet education consult for \"low residue diet education\"       NUTRITION HISTORY:  Information obtained from EMR:  -Pt admitted with acute diverticulitis with microperforation, self-contained, no abscess, no evidence of sepsis  -Per DO, pt is feeling well, has some pain, but is much improved. Having bowel movements. Tolerating diet.     Information obtained from pt:  -Pt normally follows a diet very low in fiber. He does not eat fruit, veggies, or whole grains. He consumes restaurant food, white bread products.   -Pt is intolerant to milk beverage. Can have cheese and ice cream without any problems.    Diet:  -Full liquid diet.   ?  Living situation:   -Lives at home  ?  Grocery shopping:  -Does own grocery shopping  ?  Meal preparation:  -Does own meal prep  ?    Fluids:  Water throughout the day   ?  Previous diet instructions:  None  ?  NUTRITION DIAGNOSIS:  Food- and nutrition-related knowledge deficit related to no prior exposure to nutrition related information as evidenced by report of not knowing foods that contain fiber and being instructed to limit fiber intake for diverticulitis.       INTERVENTIONS:  Provided instruction on low fiber nutrition therapy (less than 8 grams per day)  -Discussed recommended and not recommended foods  -Suggested avoiding roughage such as nuts and seeds until given the okay from provider to consume these foods again.  -Recommended decreasing intake of high fat foods such as processed meats and butter.  -Suggested avoiding tomatoes due to seeds and acidity for now, until healed.     Provided  the following handouts: Low Fiber Nutrition Therapy    Goals:  Patient verbalizes understanding of diet by repeating information learned.      Follow Up:  Patient to ask any further nutrition-related questions before discharge. In addition, pt may request outpatient RD appointment.       Emily Xavier RD, LD  Clinical Dietitian  [facility]: " [phone]  [facility]: [phone]

## 2021-07-17 NOTE — PLAN OF CARE
"Keith Cooper  Gender: male  : 1975  [address] [place] [address]  [phone] (home)     Medical Record: [id number]  Pharmacy: [facility]  [place], [place] - [address] [address]  Primary Care Provider: Sophie Sebastian    Parent's names are: Data Unavailable (mother) and Data Unavailable (father).      [facility]  2021     Discharge Phone Call:  Key Words/Key Times      Introduction - AIDET (Acknowledge, Introduce, Duration, Explanation)      Empathy-   We are calling to see how you are since your recent stay in the hospital?     Call back COMMENTS: Patient said he is feeling much better and has not been experiencing any pain.       Clinical Questions -  (f/u appts, medication side effects/purpose, ability to care for self at home) \"For your safety, it is important to us that you understand the purpose and side effects of your medications, can you tell me what your new medications are?\"     Call back COMMENTS: Patient stated he is still taking his antibiotic, he has a few more days left and everything is going well. He has not noticed any side effects. Patient stated he has a follow up appointment coming up and plans to speak with his provider about his dietary changes.        Staff Recognition -  We like to recognize staff and physicians who have done an excellent job.  Do you remember any people from your care team that you would like recognize?     Call back COMMENTS: Patient specifically mentioned, Suri Golden and Kiya as nurses he would like to recognize and thank for their care.       Very Good Care -  We want to provide very good care to all patients.  How was your care?     Call back COMMENTS: Patient stated \"You fixed me\" and he is thankful for helping him.       Opportunities for Improvement -  Our goal is to be the best.  Do you have any suggestions for things that we could improve upon?     Call back COMMENTS: No comments for improvement at " this time.      Thank You        Caller: Nora Lane RN, 7/17/21

## 2021-08-13 ENCOUNTER — OFFICE VISIT (OUTPATIENT)
Dept: SURGERY | Facility: CLINIC | Age: 46
End: 2021-08-13
Payer: COMMERCIAL

## 2021-08-13 VITALS
TEMPERATURE: 97.8 F | HEIGHT: 65 IN | WEIGHT: 147.8 LBS | DIASTOLIC BLOOD PRESSURE: 68 MMHG | SYSTOLIC BLOOD PRESSURE: 114 MMHG | BODY MASS INDEX: 24.62 KG/M2

## 2021-08-13 DIAGNOSIS — K57.32 DIVERTICULITIS OF COLON: Primary | ICD-10-CM

## 2021-08-13 PROCEDURE — 99214 OFFICE O/P EST MOD 30 MIN: CPT | Performed by: SURGERY

## 2021-08-13 ASSESSMENT — MIFFLIN-ST. JEOR: SCORE: 1482.3

## 2021-08-13 NOTE — PROGRESS NOTES
General Surgery Follow Up    Pt returns for follow up visit for diverticulitis    HPI:  He is following up today after recent hospitalization for perforated diverticulitis without abscess. He did well for a couple of weeks after discharge but then began to have mild generalized abdominal discomfort and flank pain.  He also was having hard stools so began to take Senna with minimal relief. He has been following the low residue diet. He denies fevers, chills, tachycardia.    Review Of Systems    Skin: negative  Ears/Nose/Throat: negative  Respiratory: No shortness of breath, dyspnea on exertion, cough, or hemoptysis  Cardiovascular: negative  Gastrointestinal: as above  Genitourinary: negative  Musculoskeletal: negative  Neurologic: negative  Hematologic/Lymphatic/Immunologic: negative  Endocrine: negative      No past medical history on file.    Past Surgical History:   Procedure Laterality Date     EYE SURGERY       ORTHOPEDIC SURGERY         Social History     Socioeconomic History     Marital status: Single     Spouse name: Not on file     Number of children: Not on file     Years of education: Not on file     Highest education level: Not on file   Occupational History     Not on file   Tobacco Use     Smoking status: Never Smoker     Smokeless tobacco: Current User   Substance and Sexual Activity     Alcohol use: Yes     Drug use: No     Sexual activity: Yes     Partners: Female   Other Topics Concern     Not on file   Social History Narrative     Not on file     Social Determinants of Health     Financial Resource Strain:      Difficulty of Paying Living Expenses:    Food Insecurity:      Worried About Running Out of Food in the Last Year:      Ran Out of Food in the Last Year:    Transportation Needs:      Lack of Transportation (Medical):      Lack of Transportation (Non-Medical):    Physical Activity:      Days of Exercise per Week:      Minutes of Exercise per Session:    Stress:      Feeling of Stress :   "  Social Connections:      Frequency of Communication with Friends and Family:      Frequency of Social Gatherings with Friends and Family:      Attends Holiness Services:      Active Member of Clubs or Organizations:      Attends Club or Organization Meetings:      Marital Status:    Intimate Partner Violence:      Fear of Current or Ex-Partner:      Emotionally Abused:      Physically Abused:      Sexually Abused:        No current outpatient medications on file.       Medications and history reviewed    Physical exam:  Vitals: /68   Temp 97.8  F (36.6  C) (Temporal)   Ht 1.651 m (5' 5\")   Wt 67 kg (147 lb 12.8 oz)   BMI 24.60 kg/m    BMI= Body mass index is 24.6 kg/m .    Constitutional: Healthy, alert, non-distressed   Head: Normo-cephalic, atraumatic, no lesions, masses or tenderness   Cardiovascular: RRR, no new murmurs, +S1, +S2 heart sounds, no clicks, rubs or gallops   Respiratory: CTAB, no rales, rhonchi or wheezing, equal chest rise, good respiratory effort   Gastrointestinal: Soft, mild general discomfort but no focal tenderness, non distended, no rebound rigidity or guarding, no masses or hernias palpated   : Deferred  Musculoskeletal: Moves all extremities, normal  strength, no deformities noted   Skin: No suspicious lesions or rashes   Psychiatric: Mentation appears normal, affect appropriate   Hematologic/Lymphatic/Immunologic: Normal cervical and supraclavicular lymph nodes   Patient able to get up on table without difficulty.      Labs show:  none    Imaging shows:  pending    Assessment:     ICD-10-CM    1. Diverticulitis of colon  K57.32 CT Abdomen Pelvis w Contrast     Plan: Given his ongoing discomfort and BM issues I will get CT scan for further assessment for smoldering diverticulitis or developing abscess. This could also be constipation related abdominal discomfort as well. I recommend starting a daily fiber supplement and using miralax daily until a bowel movement then prn " constipation. He will still need a colonoscopy but will wait to set this up until after I see the CT results    Rocco Lindsey, DO

## 2021-08-13 NOTE — LETTER
8/13/2021         RE: Keith Cooper  [address] [address]        Dear Colleague,    Thank you for referring your patient, Keith Cooper, to the [facility]. Please see a copy of my visit note below.    General Surgery Follow Up    Pt returns for follow up visit for diverticulitis    HPI:  He is following up today after recent hospitalization for perforated diverticulitis without abscess. He did well for a couple of weeks after discharge but then began to have mild generalized abdominal discomfort and flank pain.  He also was having hard stools so began to take Senna with minimal relief. He has been following the low residue diet. He denies fevers, chills, tachycardia.    Review Of Systems    Skin: negative  Ears/Nose/Throat: negative  Respiratory: No shortness of breath, dyspnea on exertion, cough, or hemoptysis  Cardiovascular: negative  Gastrointestinal: as above  Genitourinary: negative  Musculoskeletal: negative  Neurologic: negative  Hematologic/Lymphatic/Immunologic: negative  Endocrine: negative      No past medical history on file.    Past Surgical History:   Procedure Laterality Date     EYE SURGERY       ORTHOPEDIC SURGERY         Social History     Socioeconomic History     Marital status: Single     Spouse name: Not on file     Number of children: Not on file     Years of education: Not on file     Highest education level: Not on file   Occupational History     Not on file   Tobacco Use     Smoking status: Never Smoker     Smokeless tobacco: Current User   Substance and Sexual Activity     Alcohol use: Yes     Drug use: No     Sexual activity: Yes     Partners: Female   Other Topics Concern     Not on file   Social History Narrative     Not on file     Social Determinants of Health     Financial Resource Strain:      Difficulty of Paying Living Expenses:    Food Insecurity:      Worried About Running Out of Food in the Last Year:      Ran Out of  "Food in the Last Year:    Transportation Needs:      Lack of Transportation (Medical):      Lack of Transportation (Non-Medical):    Physical Activity:      Days of Exercise per Week:      Minutes of Exercise per Session:    Stress:      Feeling of Stress :    Social Connections:      Frequency of Communication with Friends and Family:      Frequency of Social Gatherings with Friends and Family:      Attends [employer]ist Services:      Active Member of Clubs or Organizations:      Attends Club or Organization Meetings:      Marital Status:    Intimate Partner Violence:      Fear of Current or Ex-Partner:      Emotionally Abused:      Physically Abused:      Sexually Abused:        No current outpatient medications on file.       Medications and history reviewed    Physical exam:  Vitals: /68   Temp 97.8  F (36.6  C) (Temporal)   Ht 1.651 m (5' 5\")   Wt 67 kg (147 lb 12.8 oz)   BMI 24.60 kg/m    BMI= Body mass index is 24.6 kg/m .    Constitutional: Healthy, alert, non-distressed   Head: Normo-cephalic, atraumatic, no lesions, masses or tenderness   Cardiovascular: RRR, no new murmurs, +S1, +S2 heart sounds, no clicks, rubs or gallops   Respiratory: CTAB, no rales, rhonchi or wheezing, equal chest rise, good respiratory effort   Gastrointestinal: Soft, mild general discomfort but no focal tenderness, non distended, no rebound rigidity or guarding, no masses or hernias palpated   : Deferred  Musculoskeletal: Moves all extremities, normal  strength, no deformities noted   Skin: No suspicious lesions or rashes   Psychiatric: Mentation appears normal, affect appropriate   Hematologic/Lymphatic/Immunologic: Normal cervical and supraclavicular lymph nodes   Patient able to get up on table without difficulty.      Labs show:  none    Imaging shows:  pending    Assessment:     ICD-10-CM    1. Diverticulitis of colon  K57.32 CT Abdomen Pelvis w Contrast     Plan: Given his ongoing discomfort and BM issues I will " get CT scan for further assessment for smoldering diverticulitis or developing abscess. This could also be constipation related abdominal discomfort as well. I recommend starting a daily fiber supplement and using miralax daily until a bowel movement then prn constipation. He will still need a colonoscopy but will wait to set this up until after I see the CT results    Rocco Lindsey, DO        Again, thank you for allowing me to participate in the care of your patient.        Sincerely,        Rocco Lindsey, DO

## 2021-08-16 ENCOUNTER — HOSPITAL ENCOUNTER (OUTPATIENT)
Dept: CT IMAGING | Facility: CLINIC | Age: 46
Discharge: HOME | End: 2021-08-16
Attending: SURGERY | Admitting: SURGERY
Payer: COMMERCIAL

## 2021-08-16 DIAGNOSIS — K57.32 DIVERTICULITIS OF COLON: ICD-10-CM

## 2021-08-16 PROCEDURE — 250N000011 HC RX IP 250 OP 636: Performed by: SURGERY

## 2021-08-16 PROCEDURE — 250N000009 HC RX 250: Performed by: SURGERY

## 2021-08-16 PROCEDURE — 74177 CT ABD & PELVIS W/CONTRAST: CPT

## 2021-08-16 RX ORDER — IOPAMIDOL 755 MG/ML
500 INJECTION, SOLUTION INTRAVASCULAR ONCE
Status: COMPLETED | OUTPATIENT
Start: 2021-08-16 | End: 2021-08-16

## 2021-08-16 RX ADMIN — SODIUM CHLORIDE 60 ML: 9 INJECTION, SOLUTION INTRAVENOUS at 14:50

## 2021-08-16 RX ADMIN — IOPAMIDOL 75 ML: 755 INJECTION, SOLUTION INTRAVENOUS at 14:50

## 2021-08-17 DIAGNOSIS — K57.32 DIVERTICULITIS OF COLON: Primary | ICD-10-CM

## 2021-08-30 ENCOUNTER — TELEPHONE (OUTPATIENT)
Dept: SURGERY | Facility: CLINIC | Age: 46
End: 2021-08-30

## 2021-08-30 DIAGNOSIS — Z11.59 ENCOUNTER FOR SCREENING FOR OTHER VIRAL DISEASES: ICD-10-CM

## 2021-08-30 NOTE — TELEPHONE ENCOUNTER
Date of procedure: 9/21  Colonoscopy  Surgeon: Dr. Lindsey  Prep:Miralax  Packet:Colonoscopy/EGD instructions mailed to patient's home address.   Date: 8/30/2021      Surgery Scheduler

## 2021-08-30 NOTE — LETTER

## 2021-09-17 ENCOUNTER — LAB (OUTPATIENT)
Dept: LAB | Facility: OTHER | Age: 46
End: 2021-09-17
Payer: COMMERCIAL

## 2021-09-17 DIAGNOSIS — Z11.59 ENCOUNTER FOR SCREENING FOR OTHER VIRAL DISEASES: ICD-10-CM

## 2021-09-17 PROCEDURE — U0005 INFEC AGEN DETEC AMPLI PROBE: HCPCS

## 2021-09-17 PROCEDURE — U0003 INFECTIOUS AGENT DETECTION BY NUCLEIC ACID (DNA OR RNA); SEVERE ACUTE RESPIRATORY SYNDROME CORONAVIRUS 2 (SARS-COV-2) (CORONAVIRUS DISEASE [COVID-19]), AMPLIFIED PROBE TECHNIQUE, MAKING USE OF HIGH THROUGHPUT TECHNOLOGIES AS DESCRIBED BY CMS-2020-01-R: HCPCS

## 2021-09-18 LAB — SARS-COV-2 RNA RESP QL NAA+PROBE: NEGATIVE

## 2021-09-21 ENCOUNTER — ANESTHESIA EVENT (OUTPATIENT)
Dept: GASTROENTEROLOGY | Facility: CLINIC | Age: 46
End: 2021-09-21
Payer: COMMERCIAL

## 2021-09-21 ENCOUNTER — ANESTHESIA (OUTPATIENT)
Dept: GASTROENTEROLOGY | Facility: CLINIC | Age: 46
End: 2021-09-21
Payer: COMMERCIAL

## 2021-09-21 ENCOUNTER — HOSPITAL ENCOUNTER (OUTPATIENT)
Facility: CLINIC | Age: 46
Discharge: HOME | End: 2021-09-21
Attending: SURGERY | Admitting: SURGERY
Payer: COMMERCIAL

## 2021-09-21 VITALS
HEIGHT: 65 IN | RESPIRATION RATE: 16 BRPM | SYSTOLIC BLOOD PRESSURE: 116 MMHG | TEMPERATURE: 97.9 F | OXYGEN SATURATION: 100 % | HEART RATE: 58 BPM | WEIGHT: 145 LBS | BODY MASS INDEX: 24.16 KG/M2 | DIASTOLIC BLOOD PRESSURE: 79 MMHG

## 2021-09-21 LAB — COLONOSCOPY: NORMAL

## 2021-09-21 PROCEDURE — 45378 DIAGNOSTIC COLONOSCOPY: CPT | Performed by: SURGERY

## 2021-09-21 PROCEDURE — 250N000011 HC RX IP 250 OP 636: Performed by: NURSE ANESTHETIST, CERTIFIED REGISTERED

## 2021-09-21 PROCEDURE — 370N000017 HC ANESTHESIA TECHNICAL FEE, PER MIN: Performed by: SURGERY

## 2021-09-21 PROCEDURE — 258N000003 HC RX IP 258 OP 636: Performed by: NURSE ANESTHETIST, CERTIFIED REGISTERED

## 2021-09-21 PROCEDURE — 250N000009 HC RX 250: Performed by: NURSE ANESTHETIST, CERTIFIED REGISTERED

## 2021-09-21 RX ORDER — OXYCODONE HYDROCHLORIDE 5 MG/1
5 TABLET ORAL EVERY 4 HOURS PRN
Status: DISCONTINUED | OUTPATIENT
Start: 2021-09-21 | End: 2021-09-21 | Stop reason: HOSPADM

## 2021-09-21 RX ORDER — ONDANSETRON 2 MG/ML
4 INJECTION INTRAMUSCULAR; INTRAVENOUS EVERY 30 MIN PRN
Status: DISCONTINUED | OUTPATIENT
Start: 2021-09-21 | End: 2021-09-21 | Stop reason: HOSPADM

## 2021-09-21 RX ORDER — NALOXONE HYDROCHLORIDE 0.4 MG/ML
0.4 INJECTION, SOLUTION INTRAMUSCULAR; INTRAVENOUS; SUBCUTANEOUS
Status: DISCONTINUED | OUTPATIENT
Start: 2021-09-21 | End: 2021-09-21 | Stop reason: HOSPADM

## 2021-09-21 RX ORDER — ALBUTEROL SULFATE 0.83 MG/ML
2.5 SOLUTION RESPIRATORY (INHALATION) EVERY 4 HOURS PRN
Status: DISCONTINUED | OUTPATIENT
Start: 2021-09-21 | End: 2021-09-21 | Stop reason: HOSPADM

## 2021-09-21 RX ORDER — ONDANSETRON 4 MG/1
4 TABLET, ORALLY DISINTEGRATING ORAL EVERY 6 HOURS PRN
Status: DISCONTINUED | OUTPATIENT
Start: 2021-09-21 | End: 2021-09-21 | Stop reason: HOSPADM

## 2021-09-21 RX ORDER — FLUMAZENIL 0.1 MG/ML
0.2 INJECTION, SOLUTION INTRAVENOUS
Status: DISCONTINUED | OUTPATIENT
Start: 2021-09-21 | End: 2021-09-21 | Stop reason: HOSPADM

## 2021-09-21 RX ORDER — LIDOCAINE 40 MG/G
CREAM TOPICAL
Status: DISCONTINUED | OUTPATIENT
Start: 2021-09-21 | End: 2021-09-21 | Stop reason: HOSPADM

## 2021-09-21 RX ORDER — SODIUM CHLORIDE, SODIUM LACTATE, POTASSIUM CHLORIDE, CALCIUM CHLORIDE 600; 310; 30; 20 MG/100ML; MG/100ML; MG/100ML; MG/100ML
INJECTION, SOLUTION INTRAVENOUS CONTINUOUS
Status: DISCONTINUED | OUTPATIENT
Start: 2021-09-21 | End: 2021-09-21 | Stop reason: HOSPADM

## 2021-09-21 RX ORDER — ONDANSETRON 2 MG/ML
4 INJECTION INTRAMUSCULAR; INTRAVENOUS EVERY 6 HOURS PRN
Status: DISCONTINUED | OUTPATIENT
Start: 2021-09-21 | End: 2021-09-21 | Stop reason: HOSPADM

## 2021-09-21 RX ORDER — MEPERIDINE HYDROCHLORIDE 25 MG/ML
12.5 INJECTION INTRAMUSCULAR; INTRAVENOUS; SUBCUTANEOUS
Status: DISCONTINUED | OUTPATIENT
Start: 2021-09-21 | End: 2021-09-21 | Stop reason: HOSPADM

## 2021-09-21 RX ORDER — PROPOFOL 10 MG/ML
INJECTION, EMULSION INTRAVENOUS PRN
Status: DISCONTINUED | OUTPATIENT
Start: 2021-09-21 | End: 2021-09-21

## 2021-09-21 RX ORDER — LIDOCAINE HYDROCHLORIDE 20 MG/ML
INJECTION, SOLUTION INFILTRATION; PERINEURAL PRN
Status: DISCONTINUED | OUTPATIENT
Start: 2021-09-21 | End: 2021-09-21

## 2021-09-21 RX ORDER — PROCHLORPERAZINE MALEATE 5 MG
10 TABLET ORAL EVERY 6 HOURS PRN
Status: DISCONTINUED | OUTPATIENT
Start: 2021-09-21 | End: 2021-09-21 | Stop reason: HOSPADM

## 2021-09-21 RX ORDER — ONDANSETRON 4 MG/1
4 TABLET, ORALLY DISINTEGRATING ORAL EVERY 30 MIN PRN
Status: DISCONTINUED | OUTPATIENT
Start: 2021-09-21 | End: 2021-09-21 | Stop reason: HOSPADM

## 2021-09-21 RX ORDER — NALOXONE HYDROCHLORIDE 0.4 MG/ML
0.2 INJECTION, SOLUTION INTRAMUSCULAR; INTRAVENOUS; SUBCUTANEOUS
Status: DISCONTINUED | OUTPATIENT
Start: 2021-09-21 | End: 2021-09-21 | Stop reason: HOSPADM

## 2021-09-21 RX ORDER — PROPOFOL 10 MG/ML
INJECTION, EMULSION INTRAVENOUS CONTINUOUS PRN
Status: DISCONTINUED | OUTPATIENT
Start: 2021-09-21 | End: 2021-09-21

## 2021-09-21 RX ORDER — ONDANSETRON 2 MG/ML
4 INJECTION INTRAMUSCULAR; INTRAVENOUS
Status: DISCONTINUED | OUTPATIENT
Start: 2021-09-21 | End: 2021-09-21 | Stop reason: HOSPADM

## 2021-09-21 RX ADMIN — SODIUM CHLORIDE, POTASSIUM CHLORIDE, SODIUM LACTATE AND CALCIUM CHLORIDE: 600; 310; 30; 20 INJECTION, SOLUTION INTRAVENOUS at 14:49

## 2021-09-21 RX ADMIN — PROPOFOL 200 MCG/KG/MIN: 10 INJECTION, EMULSION INTRAVENOUS at 14:55

## 2021-09-21 RX ADMIN — PROPOFOL 40 MG: 10 INJECTION, EMULSION INTRAVENOUS at 14:57

## 2021-09-21 RX ADMIN — LIDOCAINE HYDROCHLORIDE 60 MG: 20 INJECTION, SOLUTION INFILTRATION; PERINEURAL at 14:55

## 2021-09-21 RX ADMIN — PROPOFOL 50 MG: 10 INJECTION, EMULSION INTRAVENOUS at 14:55

## 2021-09-21 RX ADMIN — PROPOFOL 50 MG: 10 INJECTION, EMULSION INTRAVENOUS at 15:00

## 2021-09-21 ASSESSMENT — MIFFLIN-ST. JEOR: SCORE: 1469.6

## 2021-09-21 ASSESSMENT — LIFESTYLE VARIABLES: TOBACCO_USE: 0

## 2021-09-21 NOTE — DISCHARGE INSTRUCTIONS
[facility]    Home Care Following Endoscopy          Activity:    You have just undergone an endoscopic procedure usually performed with conscious sedation.  Do not work or operate machinery (including a car) for at least 12 hours.      I encourage you to walk and attempt to pass this air as soon as possible.    Diet:    Return to the diet you were on before your procedure but eat lightly for the first 12-24 hours.    Drink plenty of water.    Resume any regular medications unless otherwise advised by your physician.  Please begin any new medication prescribed as a result of your procedure as directed by your physician.     If you had any biopsy or polyp removed please refrain from aspirin or aspirin products for 2 days.  If on Coumadin please restart as instructed by your physician.   Pain:    You may take Tylenol as needed for pain.  Expected Recovery:    You can expect some mild abdominal fullness and/or discomfort due to the air used to inflate your intestinal tract. It is also normal to have a mild sore throat after upper endoscopy.    Call Your Physician if You Have:    After Colonoscopy:  o Worsening persisting abdominal pain which is worse with activity.  o Fevers (>101 degrees F), chills or shakes.  o Passage of continued blood with bowel movements.   Any questions or concerns about your recovery, please call [phone] or after hours [phone] Nurse Advice Line.    Follow-up Care:    You should receive a call or letter with your results within 1 week. Please call if you have not received a notification of your results.  If asked to return to clinic please make an appointment 1 week after your procedure.  Call [phone].

## 2021-09-21 NOTE — ANESTHESIA PREPROCEDURE EVALUATION
"Anesthesia Pre-Procedure Evaluation    Patient: Keith Cooper   MRN: [id number] : 1975        Preoperative Diagnosis: Diverticulitis of colon [K57.32]   Procedure : Procedure(s):  COLONOSCOPY     No past medical history on file.   Past Surgical History:   Procedure Laterality Date     EYE SURGERY       ORTHOPEDIC SURGERY        Allergies   Allergen Reactions     Percocet [Oxycodone-Acetaminophen] Itching     Bees Rash      Social History     Tobacco Use     Smoking status: Never Smoker     Smokeless tobacco: Current User   Substance Use Topics     Alcohol use: Yes      Wt Readings from Last 1 Encounters:   21 67 kg (147 lb 12.8 oz)        Anesthesia Evaluation   Pt has had prior anesthetic.     No history of anesthetic complications       ROS/MED HX  ENT/Pulmonary:  - neg pulmonary ROS  (-) tobacco use   Neurologic:  - neg neurologic ROS     Cardiovascular:  - neg cardiovascular ROS     METS/Exercise Tolerance:     Hematologic:  - neg hematologic  ROS     Musculoskeletal: Comment: Frequent joint pain      GI/Hepatic:  - neg GI/hepatic ROS  (-) GERD   Renal/Genitourinary:  - neg Renal ROS     Endo:  - neg endo ROS     Psychiatric/Substance Use:     (+) alcohol abuse (pt admitted to drinking much more than \"he should\"  He stated he can drink 14 beers and be functional)     Infectious Disease:  - neg infectious disease ROS     Malignancy:  - neg malignancy ROS     Other:  - neg other ROS          Physical Exam    Airway        Mallampati: II   TM distance: > 3 FB   Neck ROM: full   Mouth opening: > 3 cm    Respiratory Devices and Support         Dental  no notable dental history         Cardiovascular   cardiovascular exam normal       Rhythm and rate: regular and normal     Pulmonary   pulmonary exam normal        breath sounds clear to auscultation           OUTSIDE LABS:  CBC:   Lab Results   Component Value Date    WBC 5.3 2021    WBC 11.2 (H) 2021    HGB 12.1 (L) 2021    HGB 14.1 " 07/07/2021    HCT 36.8 (L) 07/09/2021    HCT 42.2 07/07/2021     07/09/2021     07/07/2021     BMP:   Lab Results   Component Value Date     07/09/2021     07/08/2021    POTASSIUM 3.9 07/09/2021    POTASSIUM 3.5 07/08/2021    CHLORIDE 110 (H) 07/09/2021    CHLORIDE 108 07/08/2021    CO2 29 07/09/2021    CO2 26 07/08/2021    BUN 5 (L) 07/09/2021    BUN 7 07/08/2021    CR 0.74 07/09/2021    CR 0.72 07/08/2021    GLC 79 07/09/2021    GLC 74 07/08/2021     COAGS: No results found for: PTT, INR, FIBR  POC: No results found for: BGM, HCG, HCGS  HEPATIC:   Lab Results   Component Value Date    ALBUMIN 2.7 (L) 07/09/2021    PROTTOTAL 6.2 (L) 07/09/2021    ALT 15 07/09/2021    AST 14 07/09/2021    ALKPHOS 37 (L) 07/09/2021    BILITOTAL 0.6 07/09/2021     OTHER:   Lab Results   Component Value Date    LINA 7.5 (L) 07/09/2021    LIPASE 72 (L) 07/07/2021       Anesthesia Plan    ASA Status:  2   NPO Status:  NPO Appropriate    Anesthesia Type: MAC.     - Reason for MAC: straight local not clinically adequate   Induction: Intravenous, Propofol.   Maintenance: TIVA.        Consents    Anesthesia Plan(s) and associated risks, benefits, and realistic alternatives discussed. Questions answered and patient/representative(s) expressed understanding.     - Discussed with:  Patient      - Extended Intubation/Ventilatory Support Discussed: No.      - Patient is DNR/DNI Status: No    Use of blood products discussed: No .     Postoperative Care            Comments:    The risks and benefits of anesthesia, and the alternatives where applicable, have been discussed with the patient, and they wish to proceed.            SRINATH Blankenship CRNA

## 2021-09-21 NOTE — ANESTHESIA CARE TRANSFER NOTE
Patient: Keith Cooper    Procedure(s):  COLONOSCOPY    Diagnosis: Diverticulitis of colon [K57.32]  Diagnosis Additional Information: No value filed.    Anesthesia Type:   MAC     Note:    Oropharynx: oropharynx clear of all foreign objects and spontaneously breathing  Level of Consciousness: drowsy  Oxygen Supplementation: face mask    Independent Airway: airway patency satisfactory and stable  Dentition: dentition unchanged  Vital Signs Stable: post-procedure vital signs reviewed and stable  Report to RN Given: handoff report given  Patient transferred to: Phase II    Handoff Report: Identifed the Patient, Identified the Reponsible Provider, Reviewed the pertinent medical history, Discussed the surgical course, Reviewed Intra-OP anesthesia mangement and issues during anesthesia, Set expectations for post-procedure period and Allowed opportunity for questions and acknowledgement of understanding      Vitals:  Vitals Value Taken Time   BP 92/66 09/21/21 1530   Temp     Pulse 50 09/21/21 1530   Resp 16 09/21/21 1520   SpO2 100 % 09/21/21 1533   Vitals shown include unvalidated device data.    Electronically Signed By: SRINATH Blankenship CRNA  September 21, 2021  3:34 PM

## 2021-09-21 NOTE — H&P
Patient seen for Endoscopy    HPI:  Patient is a 46 year old male with diverticulitis here for follow up colonoscopy. Not taking blood thinning medications. No MI or CVA history. No issues with previous sedation. No recent acute illness.    Review Of Systems    Skin: negative  Ears/Nose/Throat: negative  Respiratory: No shortness of breath, dyspnea on exertion, cough, or hemoptysis  Cardiovascular: negative  Gastrointestinal: negative  Genitourinary: negative  Musculoskeletal: negative  Neurologic: negative  Hematologic/Lymphatic/Immunologic: negative  Endocrine: negative      History reviewed. No pertinent past medical history.    Past Surgical History:   Procedure Laterality Date     EYE SURGERY       ORTHOPEDIC SURGERY         History reviewed. No pertinent family history.    Social History     Socioeconomic History     Marital status: Single     Spouse name: Not on file     Number of children: Not on file     Years of education: Not on file     Highest education level: Not on file   Occupational History     Not on file   Tobacco Use     Smoking status: Never Smoker     Smokeless tobacco: Current User   Substance and Sexual Activity     Alcohol use: Yes     Drug use: No     Sexual activity: Yes     Partners: Female   Other Topics Concern     Not on file   Social History Narrative     Not on file     Social Determinants of Health     Financial Resource Strain:      Difficulty of Paying Living Expenses:    Food Insecurity:      Worried About Running Out of Food in the Last Year:      Ran Out of Food in the Last Year:    Transportation Needs:      Lack of Transportation (Medical):      Lack of Transportation (Non-Medical):    Physical Activity:      Days of Exercise per Week:      Minutes of Exercise per Session:    Stress:      Feeling of Stress :    Social Connections:      Frequency of Communication with Friends and Family:      Frequency of Social Gatherings with Friends and Family:      Attends Synagogue  "Services:      Active Member of Clubs or Organizations:      Attends Club or Organization Meetings:      Marital Status:    Intimate Partner Violence:      Fear of Current or Ex-Partner:      Emotionally Abused:      Physically Abused:      Sexually Abused:        No current outpatient medications on file.       Medications and history reviewed    Physical exam:  Vitals: BP (!) 136/106   Temp 97.9  F (36.6  C) (Oral)   Resp 16   Ht 1.651 m (5' 5\")   Wt 65.8 kg (145 lb)   SpO2 99%   BMI 24.13 kg/m    BMI= Body mass index is 24.13 kg/m .    Constitutional: Healthy, alert, non-distressed   Head: Normo-cephalic, atraumatic, no lesions, masses or tenderness   Cardiovascular: RRR, no new murmurs, +S1, +S2 heart sounds, no clicks, rubs or gallops   Respiratory: CTAB, no rales, rhonchi or wheezing, equal chest rise, good respiratory effort   Gastrointestinal: Soft, non-tender, non distended, no rebound rigidity or guarding, no masses or hernias palpated   : Deferred  Musculoskeletal: Moves all extremities, normal  strength, no deformities noted   Skin: No suspicious lesions or rashes   Psychiatric: Mentation appears normal, affect appropriate   Hematologic/Lymphatic/Immunologic: Normal cervical and supraclavicular lymph nodes   Patient able to get up on table without difficulty.    Labs show:  No results found for this or any previous visit (from the past 24 hour(s)).    Assessment: Endoscopy  Plan: Pt cleared for anesthesia for proposed procedure.    Rocco Lindsey DO      "

## 2021-09-21 NOTE — ANESTHESIA POSTPROCEDURE EVALUATION
Patient: Keith Cooper    Procedure(s):  COLONOSCOPY    Diagnosis:Diverticulitis of colon [K57.32]  Diagnosis Additional Information: No value filed.    Anesthesia Type:  MAC    Note:  Disposition: Outpatient   Postop Pain Control: Uneventful            Sign Out: Well controlled pain   PONV: No   Neuro/Psych: Uneventful            Sign Out: Acceptable/Baseline neuro status   Airway/Respiratory: Uneventful            Sign Out: Acceptable/Baseline resp. status   CV/Hemodynamics: Uneventful            Sign Out: Acceptable CV status   Other NRE: NONE   DID A NON-ROUTINE EVENT OCCUR? No    Event details/Postop Comments:  Pt was happy with anesthesia care.  No complications.  I will follow up with the pt if needed.           Last vitals:  Vitals Value Taken Time   /79 09/21/21 1545   Temp     Pulse 58 09/21/21 1545   Resp 16 09/21/21 1545   SpO2 100 % 09/21/21 1553   Vitals shown include unvalidated device data.    Electronically Signed By: SRINATH Blankenship CRNA  September 21, 2021  5:58 PM

## 2022-11-30 ENCOUNTER — OFFICE VISIT (OUTPATIENT)
Dept: INTERNAL MEDICINE | Facility: CLINIC | Age: 47
End: 2022-11-30
Payer: COMMERCIAL

## 2022-11-30 VITALS
SYSTOLIC BLOOD PRESSURE: 126 MMHG | WEIGHT: 150 LBS | HEIGHT: 65 IN | RESPIRATION RATE: 14 BRPM | BODY MASS INDEX: 24.99 KG/M2 | OXYGEN SATURATION: 99 % | HEART RATE: 72 BPM | DIASTOLIC BLOOD PRESSURE: 78 MMHG | TEMPERATURE: 98.4 F

## 2022-11-30 DIAGNOSIS — Z00.00 ENCOUNTER FOR ROUTINE ADULT HEALTH EXAMINATION WITHOUT ABNORMAL FINDINGS: Primary | ICD-10-CM

## 2022-11-30 DIAGNOSIS — Q78.0 OSTEOGENESIS IMPERFECTA: ICD-10-CM

## 2022-11-30 DIAGNOSIS — E55.9 VITAMIN D DEFICIENCY: ICD-10-CM

## 2022-11-30 PROCEDURE — 99386 PREV VISIT NEW AGE 40-64: CPT | Performed by: INTERNAL MEDICINE

## 2022-11-30 RX ORDER — CHOLECALCIFEROL (VITAMIN D3) 50 MCG
1 TABLET ORAL DAILY
COMMUNITY
Start: 2022-11-30

## 2022-11-30 ASSESSMENT — ENCOUNTER SYMPTOMS
SORE THROAT: 0
NERVOUS/ANXIOUS: 0
ARTHRALGIAS: 1
PARESTHESIAS: 0
HEMATOCHEZIA: 0
HEMATURIA: 0
DYSURIA: 0
HEARTBURN: 0
FEVER: 0
DIZZINESS: 0
SHORTNESS OF BREATH: 0
DIARRHEA: 0
JOINT SWELLING: 0
NAUSEA: 0
MYALGIAS: 1
COUGH: 0
FREQUENCY: 0
ABDOMINAL PAIN: 0
PALPITATIONS: 0
WEAKNESS: 0
CONSTIPATION: 0
EYE PAIN: 0
CHILLS: 0
HEADACHES: 0

## 2022-11-30 NOTE — PROGRESS NOTES
SUBJECTIVE:   CC: Keith is an 47 year old who presents for preventative health visit.   Patient has been advised of split billing requirements and indicates understanding: Yes  Healthy Habits:     Getting at least 3 servings of Calcium per day:  NO    Bi-annual eye exam:  NO    Dental care twice a year:  NO    Sleep apnea or symptoms of sleep apnea:  None    Diet:  Regular (no restrictions)    Frequency of exercise:  2-3 days/week    Duration of exercise:  Greater than 60 minutes    Taking medications regularly:  Yes    Medication side effects:  Other    PHQ-2 Total Score: 0    Additional concerns today:  No    Was going to [facility] and [place]    Osteogenesis imperfecta, mother has it. Multiple fractures, ear drum deterioated and had hearing aids,   Cataracts at age 42.     Fosamax for 10 years and then is on holiday now. Normal bone density in 2021. Go back on in 2025.     No recent fractures.      Arthritis pain in his hips, worse with weather changes.     Works maintenance, relationship for 18 years. Lives in [place].     Today's PHQ-2 Score:   PHQ-2 ( 1999 [employer]) 11/30/2022   Q1: Little interest or pleasure in doing things 0   Q2: Feeling down, depressed or hopeless 0   PHQ-2 Score 0   Q1: Little interest or pleasure in doing things Not at all   Q2: Feeling down, depressed or hopeless Not at all   PHQ-2 Score 0       Have you ever done Advance Care Planning? (For example, a Health Directive, POLST, or a discussion with a medical provider or your loved ones about your wishes): No, advance care planning information given to patient to review.  Advanced care planning was discussed at today's visit.    Social History     Tobacco Use     Smoking status: Never     Smokeless tobacco: Current   Substance Use Topics     Alcohol use: Yes       Alcohol Use 11/30/2022   Prescreen: >3 drinks/day or >7 drinks/week? Yes   AUDIT SCORE  7   no problems.     Last PSA: No results found for: PSA    Reviewed orders with  "patient. Reviewed health maintenance and updated orders accordingly - Yes  Labs reviewed in EPIC    Reviewed and updated as needed this visit by clinical staff    Allergies  Meds              Reviewed and updated as needed this visit by Provider                     Review of Systems   Constitutional: Negative for chills and fever.   HENT: Positive for ear pain and hearing loss. Negative for congestion and sore throat.    Eyes: Positive for visual disturbance. Negative for pain.   Respiratory: Negative for cough and shortness of breath.    Cardiovascular: Negative for chest pain, palpitations and peripheral edema.   Gastrointestinal: Negative for abdominal pain, constipation, diarrhea, heartburn, hematochezia and nausea.   Genitourinary: Negative for dysuria, frequency, genital sores, hematuria, impotence, penile discharge and urgency.   Musculoskeletal: Positive for arthralgias and myalgias. Negative for joint swelling.   Skin: Negative for rash.   Neurological: Negative for dizziness, weakness, headaches and paresthesias.   Psychiatric/Behavioral: Negative for mood changes. The patient is not nervous/anxious.        OBJECTIVE:   /78   Pulse 72   Temp 98.4  F (36.9  C) (Temporal)   Resp 14   Ht 1.651 m (5' 5\")   Wt 68 kg (150 lb)   SpO2 99%   BMI 24.96 kg/m      Physical Exam  GENERAL: healthy, alert and no distress  EYES: Eyes grossly normal to inspection, PERRL and conjunctivae and sclerae normal  HENT: ear canals and TM's normal, nose and mouth without ulcers or lesions  NECK: no adenopathy, no asymmetry, masses, or scars and thyroid normal to palpation  RESP: lungs clear to auscultation - no rales, rhonchi or wheezes  CV: regular rate and rhythm, normal S1 S2, no S3 or S4, no murmur, click or rub, no peripheral edema and peripheral pulses strong  ABDOMEN: soft, nontender, no hepatosplenomegaly, no masses and bowel sounds normal  MS: no gross musculoskeletal defects noted, no edema  SKIN: no " suspicious lesions or rashes  NEURO: Normal strength and tone, mentation intact and speech normal  PSYCH: mentation appears normal, affect normal/bright        ASSESSMENT/PLAN:       ICD-10-CM    1. Encounter for routine adult health examination without abnormal findings  Z00.00       2. Vitamin D deficiency  E55.9 vitamin D3 (CHOLECALCIFEROL) 50 mcg (2000 units) tablet      3. Osteogenesis imperfecta  Q78.0         Patient is here for routine physical and establish care.  He had labs last year done with his former primary and does not need labs today.    Recommended a COVID booster and a flu shot but he is not interested, tetanus is up-to-date.    Had diverticulitis and had a colonoscopy which was stable.    History of osteogenesis imperfecta, he was on Fosamax for 10 years and is now on holiday until 2025.  He did have a bone density with normal bone density.    Recommended he have tobacco cessation and reduce his alcohol use.    Vitamin D deficiency recommended he take vitamin D3 2000 international units daily which he will  over-the-counter.        COUNSELING:   Reviewed preventive health counseling, as reflected in patient instructions       Regular exercise       Healthy diet/nutrition        He reports that he has never smoked. He uses smokeless tobacco.        Juarez Bonilla MD  [facility]

## 2023-08-05 ENCOUNTER — APPOINTMENT (OUTPATIENT)
Dept: CT IMAGING | Facility: CLINIC | Age: 48
End: 2023-08-05
Attending: FAMILY MEDICINE
Payer: COMMERCIAL

## 2023-08-05 ENCOUNTER — HOSPITAL ENCOUNTER (EMERGENCY)
Facility: CLINIC | Age: 48
Discharge: HOME | End: 2023-08-05
Attending: FAMILY MEDICINE | Admitting: FAMILY MEDICINE
Payer: COMMERCIAL

## 2023-08-05 VITALS
WEIGHT: 136 LBS | OXYGEN SATURATION: 98 % | BODY MASS INDEX: 22.63 KG/M2 | DIASTOLIC BLOOD PRESSURE: 82 MMHG | TEMPERATURE: 98.7 F | RESPIRATION RATE: 16 BRPM | HEART RATE: 70 BPM | SYSTOLIC BLOOD PRESSURE: 117 MMHG

## 2023-08-05 DIAGNOSIS — Q78.0 OSTEOGENESIS IMPERFECTA: ICD-10-CM
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
## 2023-08-05 DIAGNOSIS — S01.01XA SCALP LACERATION, INITIAL ENCOUNTER: ICD-10-CM

## 2023-08-05 DIAGNOSIS — S22.41XA CLOSED FRACTURE OF MULTIPLE RIBS OF RIGHT SIDE, INITIAL ENCOUNTER: ICD-10-CM
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
## 2023-08-05 DIAGNOSIS — F10.920 ALCOHOLIC INTOXICATION WITHOUT COMPLICATION (H): ICD-10-CM

## 2023-08-05 LAB
ABO/RH(D): NORMAL
ALBUMIN SERPL BCG-MCNC: 4.9 G/DL (ref 3.5–5.2)
ALP SERPL-CCNC: 46 U/L (ref 40–129)
ALT SERPL W P-5'-P-CCNC: 27 U/L (ref 0–70)
ANION GAP SERPL CALCULATED.3IONS-SCNC: 16 MMOL/L (ref 7–15)
ANTIBODY SCREEN: NEGATIVE
AST SERPL W P-5'-P-CCNC: 40 U/L (ref 0–45)
BASOPHILS # BLD AUTO: 0 10E3/UL (ref 0–0.2)
BASOPHILS NFR BLD AUTO: 0 %
BILIRUB SERPL-MCNC: 0.3 MG/DL
BUN SERPL-MCNC: 7.2 MG/DL (ref 6–20)
CALCIUM SERPL-MCNC: 9.2 MG/DL (ref 8.6–10)
CHLORIDE SERPL-SCNC: 104 MMOL/L (ref 98–107)
CREAT SERPL-MCNC: 0.7 MG/DL (ref 0.67–1.17)
DEPRECATED HCO3 PLAS-SCNC: 24 MMOL/L (ref 22–29)
EOSINOPHIL # BLD AUTO: 0.1 10E3/UL (ref 0–0.7)
EOSINOPHIL NFR BLD AUTO: 1 %
ERYTHROCYTE [DISTWIDTH] IN BLOOD BY AUTOMATED COUNT: 15.1 % (ref 10–15)
ETHANOL SERPL-MCNC: 0.22 G/DL
GFR SERPL CREATININE-BSD FRML MDRD: >90 ML/MIN/1.73M2
GLUCOSE SERPL-MCNC: 86 MG/DL (ref 70–99)
HCT VFR BLD AUTO: 44.7 % (ref 40–53)
HGB BLD-MCNC: 14.9 G/DL (ref 13.3–17.7)
IMM GRANULOCYTES # BLD: 0 10E3/UL
IMM GRANULOCYTES NFR BLD: 0 %
LYMPHOCYTES # BLD AUTO: 2 10E3/UL (ref 0.8–5.3)
LYMPHOCYTES NFR BLD AUTO: 26 %
MCH RBC QN AUTO: 32.7 PG (ref 26.5–33)
MCHC RBC AUTO-ENTMCNC: 33.3 G/DL (ref 31.5–36.5)
MCV RBC AUTO: 98 FL (ref 78–100)
MONOCYTES # BLD AUTO: 0.7 10E3/UL (ref 0–1.3)
MONOCYTES NFR BLD AUTO: 9 %
NEUTROPHILS # BLD AUTO: 4.9 10E3/UL (ref 1.6–8.3)
NEUTROPHILS NFR BLD AUTO: 64 %
NRBC # BLD AUTO: 0 10E3/UL
NRBC BLD AUTO-RTO: 0 /100
PLATELET # BLD AUTO: 222 10E3/UL (ref 150–450)
POTASSIUM SERPL-SCNC: 4.4 MMOL/L (ref 3.4–5.3)
PROT SERPL-MCNC: 7.8 G/DL (ref 6.4–8.3)
RBC # BLD AUTO: 4.56 10E6/UL (ref 4.4–5.9)
SODIUM SERPL-SCNC: 144 MMOL/L (ref 136–145)
SPECIMEN EXPIRATION DATE: NORMAL
WBC # BLD AUTO: 7.8 10E3/UL (ref 4–11)

## 2023-08-05 PROCEDURE — 96374 THER/PROPH/DIAG INJ IV PUSH: CPT | Mod: 59 | Performed by: FAMILY MEDICINE

## 2023-08-05 PROCEDURE — 82077 ASSAY SPEC XCP UR&BREATH IA: CPT | Performed by: FAMILY MEDICINE

## 2023-08-05 PROCEDURE — 96376 TX/PRO/DX INJ SAME DRUG ADON: CPT | Mod: 59 | Performed by: FAMILY MEDICINE

## 2023-08-05 PROCEDURE — 12002 RPR S/N/AX/GEN/TRNK2.6-7.5CM: CPT | Performed by: FAMILY MEDICINE

## 2023-08-05 PROCEDURE — 250N000011 HC RX IP 250 OP 636: Performed by: EMERGENCY MEDICINE

## 2023-08-05 PROCEDURE — 36415 COLL VENOUS BLD VENIPUNCTURE: CPT | Performed by: FAMILY MEDICINE

## 2023-08-05 PROCEDURE — 250N000011 HC RX IP 250 OP 636: Mod: JZ | Performed by: FAMILY MEDICINE

## 2023-08-05 PROCEDURE — 74177 CT ABD & PELVIS W/CONTRAST: CPT

## 2023-08-05 PROCEDURE — 250N000011 HC RX IP 250 OP 636: Performed by: FAMILY MEDICINE

## 2023-08-05 PROCEDURE — 85014 HEMATOCRIT: CPT | Performed by: FAMILY MEDICINE

## 2023-08-05 PROCEDURE — 96375 TX/PRO/DX INJ NEW DRUG ADDON: CPT | Mod: 59 | Performed by: FAMILY MEDICINE

## 2023-08-05 PROCEDURE — 86901 BLOOD TYPING SEROLOGIC RH(D): CPT | Performed by: FAMILY MEDICINE

## 2023-08-05 PROCEDURE — 250N000013 HC RX MED GY IP 250 OP 250 PS 637: Performed by: EMERGENCY MEDICINE

## 2023-08-05 PROCEDURE — 70450 CT HEAD/BRAIN W/O DYE: CPT

## 2023-08-05 PROCEDURE — 250N000009 HC RX 250: Performed by: FAMILY MEDICINE

## 2023-08-05 PROCEDURE — 86850 RBC ANTIBODY SCREEN: CPT | Performed by: FAMILY MEDICINE

## 2023-08-05 PROCEDURE — 99285 EMERGENCY DEPT VISIT HI MDM: CPT | Mod: 25 | Performed by: FAMILY MEDICINE

## 2023-08-05 PROCEDURE — 72125 CT NECK SPINE W/O DYE: CPT

## 2023-08-05 PROCEDURE — 99284 EMERGENCY DEPT VISIT MOD MDM: CPT | Mod: 25 | Performed by: FAMILY MEDICINE

## 2023-08-05 PROCEDURE — 80053 COMPREHEN METABOLIC PANEL: CPT | Performed by: FAMILY MEDICINE

## 2023-08-05 RX ORDER — FENTANYL CITRATE 50 UG/ML
50 INJECTION, SOLUTION INTRAMUSCULAR; INTRAVENOUS ONCE
Status: COMPLETED | OUTPATIENT
Start: 2023-08-05 | End: 2023-08-05

## 2023-08-05 RX ORDER — LIDOCAINE 4 G/G
2 PATCH TOPICAL ONCE
Status: DISCONTINUED | OUTPATIENT
Start: 2023-08-05 | End: 2023-08-05 | Stop reason: HOSPADM

## 2023-08-05 RX ORDER — IOPAMIDOL 755 MG/ML
100 INJECTION, SOLUTION INTRAVASCULAR ONCE
Status: COMPLETED | OUTPATIENT
Start: 2023-08-05 | End: 2023-08-05

## 2023-08-05 RX ORDER — HYDROMORPHONE HYDROCHLORIDE 4 MG/1
4 TABLET ORAL EVERY 6 HOURS PRN
Qty: 12 TABLET | Refills: 0 | Status: SHIPPED | OUTPATIENT
Start: 2023-08-05 | End: 2023-08-08

## 2023-08-05 RX ADMIN — LIDOCAINE 2 PATCH: 560 PATCH PERCUTANEOUS; TOPICAL; TRANSDERMAL at 07:54

## 2023-08-05 RX ADMIN — HYDROMORPHONE HYDROCHLORIDE 1 MG: 1 INJECTION, SOLUTION INTRAMUSCULAR; INTRAVENOUS; SUBCUTANEOUS at 07:50

## 2023-08-05 RX ADMIN — SODIUM CHLORIDE 60 ML: 9 INJECTION, SOLUTION INTRAVENOUS at 06:47

## 2023-08-05 RX ADMIN — IOPAMIDOL 75 ML: 755 INJECTION, SOLUTION INTRAVENOUS at 06:47

## 2023-08-05 RX ADMIN — FENTANYL CITRATE 50 MCG: 50 INJECTION, SOLUTION INTRAMUSCULAR; INTRAVENOUS at 06:22

## 2023-08-05 RX ADMIN — FENTANYL CITRATE 50 MCG: 50 INJECTION, SOLUTION INTRAMUSCULAR; INTRAVENOUS at 07:18

## 2023-08-05 ASSESSMENT — ACTIVITIES OF DAILY LIVING (ADL)
ADLS_ACUITY_SCORE: 35
ADLS_ACUITY_SCORE: 35
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
## 2023-08-05 NOTE — Clinical Note
Keith Cooper was seen and treated in our emergency department on 8/5/2023.  He may return to work on 08/07/2023.  May need to have restrictions on lifting no heavier than 10 pounds for up to 2 weeks     If you have any questions or concerns, please don't hesitate to call.      Oralia Ndiaye, DO

## 2023-08-05 NOTE — DISCHARGE INSTRUCTIONS
You have 3 rib fractures on your right side, ribs #6, #7, #8    It is important to continue to take deep breaths when you have rib fractures.  Complications of rib fractures can include atelectasis (collapse of small airways), developing a pneumonia, or larger collapse of your lung (pneumothorax)    May need to brace your side with a pillow or other soft object if you have to cough or sneeze to help with pain    You may take Tylenol or ibuprofen per bottle instructions as needed for mild or moderate pain.  You can also use topical lidocaine patches which are available over-the-counter to help with local pain.  Remove patch after 12 hours of wear, and be patch free for 12 hours before applying a new one.  Do not apply heating pad over a lidocaine patch if you have it on as it can lead to lidocaine toxicity    You have more severe pain, you may take 1 tablet of the oral Dilaudid every 6 hours as needed.  This medicine can make you drowsy so do not drive or drink alcohol if taking this medicine    The scalp laceration was repaired with skin glue.  Do not put antibiotic ointment on top of this as it will dissolve the glue.  May keep covered with a bandage.  It is okay if this gets wet if you are showering or bathing.  Do not scrub over the wound or the adhesive as this can pull the adhesive off and open the wound back up.  May keep covered with a bandage as desired    You should follow-up within 1 week with your primary doctor.    Return promptly to the ER for any new or worsening symptoms

## 2023-08-05 NOTE — ED NOTES
Bed: ED10  Expected date: 8/5/23  Expected time: 5:42 AM  Means of arrival: Ambulance  Comments:  EMS: 47YO M

## 2023-08-05 NOTE — ED PROVIDER NOTES
Assumed care at change of shift from Dr. Aidan Davis.  See his note for patient presenting details and initial work-up.  Briefly, this is a 48-year-old male with past medical history of osteogenesis imperfecta, who was intoxicated, lost his balance and fell down.  Has rib pain and an abrasion on his forehead.  Wound care pending CT imaging for evaluation of underlying traumatic injury.    Results for orders placed or performed during the hospital encounter of 08/05/23   CT Chest/Abdomen/Pelvis w Contrast     Status: None (Preliminary result)    Narrative    EXAM: CT CHEST/ABDOMEN/PELVIS WITH CONTRAST  LOCATION: [facility]  DATE: 08/05/2023    INDICATION: Trauma. Fall. Right rib and chest pain with ecchymosis.  COMPARISON: CT of the abdomen and pelvis performed 08/16/2021.  TECHNIQUE: CT scan of the chest, abdomen, and pelvis was performed following injection of IV contrast. Multiplanar reformats were obtained. Dose reduction techniques were used.   CONTRAST: 75 mL Isovue 370.    FINDINGS:   LUNGS AND PLEURA: No lung masses or consolidation. No pneumothorax. No pleural effusions.    MEDIASTINUM/AXILLAE: No acute mediastinal abnormality. No enlarged lymph nodes in the chest. No pericardial effusion.    CORONARY ARTERY CALCIFICATION: None.    HEPATOBILIARY: Normal.    PANCREAS: Normal.    SPLEEN: Normal.    ADRENAL GLANDS: Normal.    KIDNEYS/BLADDER: No significant mass, stone, or hydronephrosis.    BOWEL: Scattered colonic diverticulosis. No obstruction or inflammatory change.    LYMPH NODES: No lymphadenopathy.    VASCULATURE: Mild atherosclerotic aortoiliac calcification.    PELVIC ORGANS: Normal.    MUSCULOSKELETAL: There are mildly displaced acute fractures of the right sixth, seventh, and eighth ribs laterally, with mild scattered associated gas bubbles in the right chest wall. Mild overlying increased density in the subcutaneous fat, consistent   with chest wall bruising. No other  acute fractures are identified. There is an old fracture of the left ninth rib laterally. 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 Degenerative changes in both   hips, moderate on the right and mild on the left.      Impression    IMPRESSION:  1.  There are displaced acute fractures of the right sixth through eighth ribs laterally.  2.  No other acute fractures are identified. No pneumothorax.     CT Head w/o Contrast     Status: None    Narrative    EXAM: CT HEAD W/O CONTRAST  LOCATION: [facility]  DATE: 8/5/2023    INDICATION: fall, head chest trauma, EtOH, ? LOC, no recall, osteogenesis imperfecta  COMPARISON: Head CT 12/22/2018  TECHNIQUE: Routine CT Head without IV contrast. Multiplanar reformats. Dose reduction techniques were used.    FINDINGS:  INTRACRANIAL CONTENTS: No intracranial hemorrhage. Mild diffuse cerebral parenchymal volume loss. No midline shift. The basilar cisterns are patent. The lateral and the third ventricles are dilated greater than expected for the degree of sulcal   dilatation, stable to slightly increased compared to the prior exam. There is suggestion of some funneling at the level of the cerebral aqueduct. No cerebellar tonsillar ectopia. Mild periventricular and scattered foci of deep white matter hypodensities   involving both cerebral hemispheres. No CT evidence for an acute infarct. Small to moderate-sized arachnoid cyst in the lateral right cerebellar convexity.    VISUALIZED ORBITS/SINUSES/MASTOIDS: No intraorbital abnormality. No paranasal sinus mucosal disease. Mild nasoseptal bowing to the left anteriorly. No middle ear or mastoid effusion.    BONES/SOFT TISSUES: Small soft tissue swelling over the right forehead and in the right parietal scalp. No depressed skull fractures.      Impression    IMPRESSION:  1.  Small soft tissue swelling over the right forehead and in the right parietal  scalp. No depressed skull fractures.  2.  No intracranial hemorrhage.  3.  Intracranial contents are not significantly changed compared to prior head CT 12/22/2018.  4.  The lateral and the third ventricles are dilated greater than expected for the degree of sulcal dilatation, stable to slightly increased compared to the prior exam. There is suggestion of some funneling at the level of the cerebral aqueduct.   5.  Mild diffuse cerebral parenchymal volume loss. Presumed chronic hypertensive/microvascular ischemic white matter changes.   CT Cervical Spine w/o Contrast     Status: None    Narrative    EXAM: CT CERVICAL SPINE W/O CONTRAST  LOCATION: [facility]  DATE: 8/5/2023    INDICATION: fall, head chest trauma, EtOH, ? LOC, no recall, osteogenesis imperfecta  COMPARISON: None.  TECHNIQUE: Routine CT Cervical Spine without IV contrast. Multiplanar reformats. Dose reduction techniques were used.    FINDINGS:  VERTEBRA: Normal sagittal alignment. The craniocervical junction is within normal limits. Mild vertebral body height loss of C7. The rest of the vertebral body heights are maintained. No other fractures.    CANAL/FORAMINA: Mild intervertebral disc height loss at T1-T2. No high-grade spinal canal narrowing. Mild right neuroforaminal narrowing at C5-C6. Mild bilateral neuroforaminal narrowing at C6-C7.    PARASPINAL: The lung apices are clear.      Impression    IMPRESSION:  1.  Mild vertebral body height loss of C7, age indeterminate. It could be long-standing. Please correlate with point tenderness over this level. If clinically indicated, MRI can determine chronicity.  2.  Otherwise no fractures or posttraumatic subluxations.   Comprehensive metabolic panel     Status: Abnormal   Result Value Ref Range    Sodium 144 136 - 145 mmol/L    Potassium 4.4 3.4 - 5.3 mmol/L    Chloride 104 98 - 107 mmol/L    Carbon Dioxide (CO2) 24 22 - 29 mmol/L    Anion Gap 16 (H) 7 - 15 mmol/L    Urea  Nitrogen 7.2 6.0 - 20.0 mg/dL    Creatinine 0.70 0.67 - 1.17 mg/dL    Calcium 9.2 8.6 - 10.0 mg/dL    Glucose 86 70 - 99 mg/dL    Alkaline Phosphatase 46 40 - 129 U/L    AST 40 0 - 45 U/L    ALT 27 0 - 70 U/L    Protein Total 7.8 6.4 - 8.3 g/dL    Albumin 4.9 3.5 - 5.2 g/dL    Bilirubin Total 0.3 <=1.2 mg/dL    GFR Estimate >90 >60 mL/min/1.73m2   Ethyl Alcohol Level     Status: Abnormal   Result Value Ref Range    Alcohol ethyl 0.22 (H) <=0.01 g/dL   CBC with platelets and differential     Status: Abnormal   Result Value Ref Range    WBC Count 7.8 4.0 - 11.0 10e3/uL    RBC Count 4.56 4.40 - 5.90 10e6/uL    Hemoglobin 14.9 13.3 - 17.7 g/dL    Hematocrit 44.7 40.0 - 53.0 %    MCV 98 78 - 100 fL    MCH 32.7 26.5 - 33.0 pg    MCHC 33.3 31.5 - 36.5 g/dL    RDW 15.1 (H) 10.0 - 15.0 %    Platelet Count 222 150 - 450 10e3/uL    % Neutrophils 64 %    % Lymphocytes 26 %    % Monocytes 9 %    % Eosinophils 1 %    % Basophils 0 %    % Immature Granulocytes 0 %    NRBCs per 100 WBC 0 <1 /100    Absolute Neutrophils 4.9 1.6 - 8.3 10e3/uL    Absolute Lymphocytes 2.0 0.8 - 5.3 10e3/uL    Absolute Monocytes 0.7 0.0 - 1.3 10e3/uL    Absolute Eosinophils 0.1 0.0 - 0.7 10e3/uL    Absolute Basophils 0.0 0.0 - 0.2 10e3/uL    Absolute Immature Granulocytes 0.0 <=0.4 10e3/uL    Absolute NRBCs 0.0 10e3/uL   Adult Type and Screen     Status: None   Result Value Ref Range    ABO/RH(D) A POS     Antibody Screen Negative Negative    SPECIMEN EXPIRATION DATE [id number]    CBC with platelets differential     Status: Abnormal    Narrative    The following orders were created for panel order CBC with platelets differential.  Procedure                               Abnormality         Status                     ---------                               -----------         ------                     CBC with platelets and d...[[id number]]  Abnormal            Final result                 Please view results for these tests on the individual orders.  "  ABO/Rh type and screen     Status: None    Narrative    The following orders were created for panel order ABO/Rh type and screen.  Procedure                               Abnormality         Status                     ---------                               -----------         ------                     Adult Type and Screen[[id number]]                            Final result                 Please view results for these tests on the individual orders.      Labs and imaging as above.  Patient was seen and examined.  He states that his pain is now \"150 out of 10 \".  Ordered for IV Dilaudid.  Also ordered for lidocaine patches after reviewing imaging results and noting 3 rib fractures on right side.  Patient does have a curved laceration on right forehead, no underlying skull fracture.  This was repaired with glue.  Also, noting abnormality on CT scan of cervical spine, palpated the patient's spine and he states that he has no pain, no tenderness to palpation.    [facility] Procedure Note        Laceration Repair:    Performed by: Oralia Ndiaye DO  Authorized by: Oralia Ndiaye DO  Consent given by: Patient who states understanding of the procedure being performed after discussing the risks, benefits and alternatives.    Preparation: Patient was prepped and draped in usual sterile fashion.  Irrigation solution: saline    Body area:scalp  Laceration length: 3cm  Contamination: The wound is not contaminated.  Foreign bodies:none  Tendon involvement: none  Anesthesia: None      Debridement: none  Skin closure: Closed with Wound adhesive  Technique: Wound adhesive  Approximation: close  Approximation difficulty: simple    Patient tolerance: Patient tolerated the procedure well with no immediate complications.     Spoke to the patient about plan for admission.  He asks if that is necessary.  We will try to get pain under control with first IV Dilaudid and lidocaine patches, then may try to advance to " oral medications if he still wants to try and go home.  However, with 3 rib fractures and history of osteogenesis imperfecta, would favor keeping the patient in the hospital for observation and monitoring.    8:50 AM patient had been reassessed, pain is somewhat controlled and he does not want to be hospitalized.  Instructed on possible complications from rib fracture.  Patient was instructed on incentive spirometry by nursing staff.  Again explained the importance of doing incentive spirometry at home and was provided with a handout for further instructions for reference.  Provided the patient with a work note and prescription for pain medication to help with symptoms.  He says that he has lidocaine patches at home and does not want a prescription.  He was advised to return promptly to the ER if he develops fever, difficulty breathing, shortness of breath, cough, or uncontrolled pain.  All questions were answered.  He called for his wife to drive him home.  Left the department in no distress.  No increased work of breathing, stable vital signs, oxygen saturation is 96 to 98% on room air     Oralia Ndiaye,   08/05/23 1847

## 2024-10-09 NOTE — ED AVS SNAPSHOT
[facility]  [address] [place] [address]  Phone:  [phone]  Fax:  [phone]                                    Keith Cooper   MRN: [id number]    Department:  [facility]   Date of Visit:  12/22/2018           After Visit Summary Signature Page    I have received my discharge instructions, and my questions have been answered. I have discussed any challenges I see with this plan with the nurse or doctor.    ..........................................................................................................................................  Patient/Patient Representative Signature      ..........................................................................................................................................  Patient Representative Print Name and Relationship to Patient    ..................................................               ................................................  Date                                   Time    ..........................................................................................................................................  Reviewed by Signature/Title    ...................................................              ..............................................  Date                                               Time          22EPIC Rev 08/18        [Negative] : Heme/Lymph [FreeTextEntry5] : See HPI.  [FreeTextEntry6] : See HPI.